# Patient Record
Sex: FEMALE | Race: WHITE | NOT HISPANIC OR LATINO | URBAN - METROPOLITAN AREA
[De-identification: names, ages, dates, MRNs, and addresses within clinical notes are randomized per-mention and may not be internally consistent; named-entity substitution may affect disease eponyms.]

---
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
## 2021-04-02 ENCOUNTER — OFFICE VISIT - RIVER FALLS (OUTPATIENT)
Dept: FAMILY MEDICINE | Facility: CLINIC | Age: 21
End: 2021-04-02

## 2021-04-02 ASSESSMENT — MIFFLIN-ST. JEOR: SCORE: 1370.29

## 2021-04-03 LAB
BUN SERPL-MCNC: 17 MG/DL (ref 7–25)
BUN/CREAT RATIO - HISTORICAL: NORMAL (ref 6–22)
CALCIUM SERPL-MCNC: 9.2 MG/DL (ref 8.6–10.2)
CHLORIDE BLD-SCNC: 104 MMOL/L (ref 98–110)
CO2 SERPL-SCNC: 27 MMOL/L (ref 20–32)
CREAT SERPL-MCNC: 0.69 MG/DL (ref 0.5–1.1)
EGFRCR SERPLBLD CKD-EPI 2021: 125 ML/MIN/1.73M2
ERYTHROCYTE [DISTWIDTH] IN BLOOD BY AUTOMATED COUNT: 11.9 % (ref 11–15)
GLUCOSE BLD-MCNC: 88 MG/DL (ref 65–99)
HCT VFR BLD AUTO: 39.8 % (ref 35–45)
HGB BLD-MCNC: 13.6 GM/DL (ref 11.7–15.5)
HIV AG/AB  - NOTE: NORMAL
MCH RBC QN AUTO: 30.6 PG (ref 27–33)
MCHC RBC AUTO-ENTMCNC: 34.2 GM/DL (ref 32–36)
MCV RBC AUTO: 89.4 FL (ref 80–100)
PLATELET # BLD AUTO: 222 10*3/UL (ref 140–400)
PMV BLD: 10.8 FL (ref 7.5–12.5)
POTASSIUM BLD-SCNC: 3.9 MMOL/L (ref 3.5–5.3)
RBC # BLD AUTO: 4.45 10*6/UL (ref 3.8–5.1)
SODIUM SERPL-SCNC: 140 MMOL/L (ref 135–146)
TSH SERPL DL<=0.005 MIU/L-ACNC: 1.99 MIU/L
WBC # BLD AUTO: 6.3 10*3/UL (ref 3.8–10.8)

## 2021-04-05 ENCOUNTER — COMMUNICATION - RIVER FALLS (OUTPATIENT)
Dept: FAMILY MEDICINE | Facility: CLINIC | Age: 21
End: 2021-04-05

## 2021-04-09 ENCOUNTER — AMBULATORY - RIVER FALLS (OUTPATIENT)
Dept: FAMILY MEDICINE | Facility: CLINIC | Age: 21
End: 2021-04-09

## 2021-04-11 LAB
GAMMA INTERFERON BACKGROUND BLD IA-ACNC: 0.01 IU/ML
M TB IFN-G BLD-IMP: NEGATIVE
M TB IFN-G CD4+ BCKGRND COR BLD-ACNC: 7.76 IU/ML
MITOGEN IGNF BCKGRD COR BLD-ACNC: 0 IU/ML
MITOGEN IGNF BCKGRD COR BLD-ACNC: 0 IU/ML

## 2021-04-12 ENCOUNTER — COMMUNICATION - RIVER FALLS (OUTPATIENT)
Dept: FAMILY MEDICINE | Facility: CLINIC | Age: 21
End: 2021-04-12

## 2021-05-27 ENCOUNTER — OFFICE VISIT - RIVER FALLS (OUTPATIENT)
Dept: FAMILY MEDICINE | Facility: CLINIC | Age: 21
End: 2021-05-27

## 2021-05-27 ENCOUNTER — AMBULATORY - RIVER FALLS (OUTPATIENT)
Dept: FAMILY MEDICINE | Facility: CLINIC | Age: 21
End: 2021-05-27

## 2021-05-28 LAB — SARS-COV-2 RNA RESP QL NAA+PROBE: NEGATIVE

## 2021-10-28 ENCOUNTER — OFFICE VISIT - RIVER FALLS (OUTPATIENT)
Dept: FAMILY MEDICINE | Facility: CLINIC | Age: 21
End: 2021-10-28

## 2022-02-12 VITALS
TEMPERATURE: 99.1 F | BODY MASS INDEX: 20.57 KG/M2 | WEIGHT: 128 LBS | HEIGHT: 66 IN | TEMPERATURE: 98.2 F | HEART RATE: 70 BPM | DIASTOLIC BLOOD PRESSURE: 52 MMHG | HEART RATE: 104 BPM | SYSTOLIC BLOOD PRESSURE: 90 MMHG | SYSTOLIC BLOOD PRESSURE: 102 MMHG | OXYGEN SATURATION: 100 % | BODY MASS INDEX: 19.51 KG/M2 | OXYGEN SATURATION: 98 % | DIASTOLIC BLOOD PRESSURE: 74 MMHG | HEIGHT: 66 IN | WEIGHT: 121.4 LBS

## 2022-02-12 VITALS
BODY MASS INDEX: 20.65 KG/M2 | TEMPERATURE: 98.4 F | HEART RATE: 90 BPM | DIASTOLIC BLOOD PRESSURE: 58 MMHG | DIASTOLIC BLOOD PRESSURE: 60 MMHG | OXYGEN SATURATION: 99 % | HEART RATE: 96 BPM | DIASTOLIC BLOOD PRESSURE: 58 MMHG | WEIGHT: 132 LBS | BODY MASS INDEX: 20.78 KG/M2 | TEMPERATURE: 98.8 F | SYSTOLIC BLOOD PRESSURE: 93 MMHG | SYSTOLIC BLOOD PRESSURE: 94 MMHG | HEART RATE: 76 BPM | WEIGHT: 126 LBS | OXYGEN SATURATION: 99 % | TEMPERATURE: 98 F | BODY MASS INDEX: 21.21 KG/M2 | WEIGHT: 126.8 LBS | HEIGHT: 66 IN | SYSTOLIC BLOOD PRESSURE: 102 MMHG

## 2022-02-12 VITALS
HEART RATE: 88 BPM | HEART RATE: 80 BPM | WEIGHT: 125 LBS | DIASTOLIC BLOOD PRESSURE: 60 MMHG | SYSTOLIC BLOOD PRESSURE: 92 MMHG | BODY MASS INDEX: 20.09 KG/M2 | HEIGHT: 66 IN | DIASTOLIC BLOOD PRESSURE: 58 MMHG | DIASTOLIC BLOOD PRESSURE: 62 MMHG | SYSTOLIC BLOOD PRESSURE: 102 MMHG | BODY MASS INDEX: 20.76 KG/M2 | TEMPERATURE: 98.6 F | TEMPERATURE: 98.2 F | HEART RATE: 76 BPM | SYSTOLIC BLOOD PRESSURE: 92 MMHG | HEIGHT: 66 IN | HEIGHT: 66 IN | WEIGHT: 125.8 LBS | WEIGHT: 129.2 LBS | TEMPERATURE: 98.7 F | BODY MASS INDEX: 20.22 KG/M2

## 2022-02-12 VITALS
DIASTOLIC BLOOD PRESSURE: 71 MMHG | OXYGEN SATURATION: 98 % | SYSTOLIC BLOOD PRESSURE: 110 MMHG | WEIGHT: 119.2 LBS | HEART RATE: 144 BPM | TEMPERATURE: 100.9 F

## 2022-02-12 VITALS
SYSTOLIC BLOOD PRESSURE: 120 MMHG | SYSTOLIC BLOOD PRESSURE: 98 MMHG | WEIGHT: 126.8 LBS | WEIGHT: 129.8 LBS | HEIGHT: 66 IN | TEMPERATURE: 98.5 F | BODY MASS INDEX: 20.86 KG/M2 | HEART RATE: 76 BPM | DIASTOLIC BLOOD PRESSURE: 62 MMHG | DIASTOLIC BLOOD PRESSURE: 68 MMHG | BODY MASS INDEX: 20.38 KG/M2 | HEIGHT: 66 IN | HEART RATE: 68 BPM | TEMPERATURE: 97.9 F

## 2022-02-12 VITALS
HEART RATE: 146 BPM | TEMPERATURE: 98.8 F | OXYGEN SATURATION: 98 % | DIASTOLIC BLOOD PRESSURE: 70 MMHG | SYSTOLIC BLOOD PRESSURE: 112 MMHG | HEIGHT: 65 IN | WEIGHT: 123.8 LBS | BODY MASS INDEX: 20.62 KG/M2

## 2022-02-12 VITALS
BODY MASS INDEX: 20.42 KG/M2 | DIASTOLIC BLOOD PRESSURE: 60 MMHG | HEART RATE: 64 BPM | WEIGHT: 124.6 LBS | SYSTOLIC BLOOD PRESSURE: 92 MMHG | TEMPERATURE: 98.2 F

## 2022-02-12 VITALS
HEIGHT: 66 IN | OXYGEN SATURATION: 97 % | SYSTOLIC BLOOD PRESSURE: 96 MMHG | HEART RATE: 75 BPM | TEMPERATURE: 98.3 F | DIASTOLIC BLOOD PRESSURE: 60 MMHG | BODY MASS INDEX: 21.13 KG/M2 | WEIGHT: 131.5 LBS

## 2022-02-12 VITALS
WEIGHT: 127 LBS | WEIGHT: 129 LBS | TEMPERATURE: 97.3 F | RESPIRATION RATE: 16 BRPM | TEMPERATURE: 97.9 F | HEART RATE: 80 BPM | SYSTOLIC BLOOD PRESSURE: 106 MMHG | BODY MASS INDEX: 20.41 KG/M2 | HEIGHT: 66 IN | DIASTOLIC BLOOD PRESSURE: 68 MMHG | SYSTOLIC BLOOD PRESSURE: 94 MMHG | OXYGEN SATURATION: 99 % | HEART RATE: 70 BPM | DIASTOLIC BLOOD PRESSURE: 64 MMHG

## 2022-02-12 VITALS
WEIGHT: 126.6 LBS | SYSTOLIC BLOOD PRESSURE: 100 MMHG | OXYGEN SATURATION: 98 % | TEMPERATURE: 97.5 F | HEART RATE: 101 BPM | DIASTOLIC BLOOD PRESSURE: 60 MMHG

## 2022-02-12 VITALS
HEART RATE: 80 BPM | HEIGHT: 66 IN | SYSTOLIC BLOOD PRESSURE: 104 MMHG | TEMPERATURE: 98.4 F | BODY MASS INDEX: 20.65 KG/M2 | DIASTOLIC BLOOD PRESSURE: 60 MMHG

## 2022-02-15 NOTE — PROGRESS NOTES
Patient:   JUAN SHAW            MRN: [id number]            FIN: [id number]               Age:   17 years     Sex:  Female     :  2000   Associated Diagnoses:   Acute URI; Asthma exacerbation   Author:   Barbra Romero      Visit Information      Date of Service: 2017 10:55 am  Performing Location: [facility]  Encounter#: [id number]      Chief Complaint   2017 11:08 AM CDT    Cough, fatigue x5 days.      History of Present Illness   Patient is a 17 year old female with past medical history of asthma who presents with her mother for evaluation of productive cough and worsening shortness of breath over the past 5 days. Patient reports onset of fatigue, cough, headache, body aches, and difficulty breathing 4 days ago. Also complains of intermittent fever and chills. States shortness of breath has continued to worsen despite following her asthma action plan. She is using albuterol, flovent, and advair as prescribed. Patient woke up with more severe symptoms this morning and used nebulizer machine around 0800. She proceeded to go to work at [employer] but within 45 minutes had to leave due to her symptoms. Mother subsequently brought patient to clinic to be evaluated. Denies hemoptysis, sore throat, nausea, vomiting, abdominal pain, or chest pain. Notes that she has been exposed to sick contacts including boyfriend and friends at school. Patient reports presentation feels similar to asthma exacerbations in the past, notes these are usually triggered by URIs. Last exacerbation was in 2016 - she was treated at that time with prednisone with good relief.       Review of Systems   Constitutional:  Fever, Chills.    Eye:  Negative.    Ear/Nose/Mouth/Throat:  Negative.    Respiratory:  Shortness of breath, Cough, Wheezing, No hemoptysis.    Cardiovascular:  No chest pain.       Health Status   Allergies:    Allergic Reactions (Selected)  No Known Medication Allergies    Medications:  (Selected)   Prescriptions  Prescribed  predniSONE 20 mg oral tablet: 2 tab(s) ( 40 mg ), PO, Daily, # 10 tab(s), 0 Refill(s), Type: Maintenance, Pharmacy: [id number], 2 tab(s) po daily,x5 day(s)  Documented Medications  Documented  Advair Diskus 250 mcg-50 mcg inhalation powder: 2 puff(s), inh, bid, 0 Refill(s), Type: Maintenance  Flovent  mcg/inh inhalation aerosol: 2 puff(s), inh, bid, 0 Refill(s), Type: Maintenance  Kraen 3 mg-0.03 mg oral tablet: 1 tab(s), po, daily, 0 Refill(s), Type: Maintenance  albuterol 90mcg/inhalation MDI: 0 Refill(s), Type: Maintenance   Problem list:    All Problems  Asthma / SNOMED CT 079772861 / Confirmed      Histories   Past Medical History:    No active or resolved past medical history items have been selected or recorded.   Family History:    Diabetes mellitus  Grandfather (M)  Seizure  Brother  HTN - Hypertension  Grandfather (M)  Grandmother (M)     Procedure history:    No active procedure history items have been selected or recorded.   Social History:        Alcohol Assessment            Never      Tobacco Assessment            Never      Home and Environment Assessment            Risks in environment: Gun in the home..        Physical Examination   Vital Signs   4/9/2017 11:08 AM CDT Temperature Tympanic 100.9 DegF  HI    Peripheral Pulse Rate 144 bpm  HI    Systolic Blood Pressure 110 mmHg    Diastolic Blood Pressure 71 mmHg    Mean Arterial Pressure 84 mmHg    Oxygen Saturation 98 %    Vital Signs Comments HR elevated due to nebulizer treatment at 730am      Measurements from flowsheet : Measurements   4/9/2017 11:08 AM CDT    Weight Measured - Standard                119.2 lb     General:  Alert and oriented, No acute distress.    Eye:  Pupils are equal, round and reactive to light, Normal conjunctiva.    HENT:  Tympanic membranes are clear, Oral mucosa is moist, No pharyngeal erythema.    Neck:  Supple, Non-tender, No  lymphadenopathy.    Respiratory:  Decreased air movement throughout lung fields, no audible wheezing but patient notes that she did use nebulizer just prior to coming to clinic today.    Cardiovascular:  Regular rhythm, No murmur, No gallop, Tachycardia.    Musculoskeletal:  Normal range of motion, Normal gait.    Integumentary:  Warm, Dry, No rash.    Neurologic:  Alert, Oriented.    Psychiatric:  Cooperative, Appropriate mood & affect.       Impression and Plan   Diagnosis     Acute URI (WBE21-KS J06.9).     Asthma exacerbation (MMY68-LE J45.901).     Patient presents with asthma exacerbation exacerbated in setting of URI. Febrile at 100.9F upon arrival. Also tachycardic at 144 bpm but patient notes that she used nebulizer at home this morning prior to coming to clinic. Will treat patient with prednisone burst x 5 days. Given her fever today and complaint of productive coughing, will also provide z-quinn for patient to take as prescribed. Mother wondering if patient can have rx for diflucan as she is prone to yeast infections after abx - this was called in to pharmacy. School note and work note provided upon discharge. Recommended her to continue using home asthma treatments. She was advised to return to clinic if symptoms not improving or go to ED if symtoms worsen. Patient and her mother verbalized understanding and agreement with treatment plan. All questions were answered.    agree with exam and plan

## 2022-02-15 NOTE — PROGRESS NOTES
Patient:   JUAN SHAW            MRN: [id number]            FIN: [id number]               Age:   19 years     Sex:  Female     :  2000   Associated Diagnoses:   Acute otitis media, right   Author:   Frandy Hernandez MD      Visit Information      Date of Service: 2020 02:40 pm  Performing Location: [facility]  Encounter#: [id number]      Primary Care Provider (PCP):  NONE ,       Referring Provider:  Frandy Hernandez MD    NPI# [id number]      Chief Complaint   2020 2:48 PM CST    Patient presents with plugged right ear and sore throat x4 days.        History of Present Illness   chief complaint and symptoms as noted above confirmed with patient   green nasal dc  no fever      Review of Systems   Constitutional:  Negative except as documented in history of present illness.    Ear/Nose/Mouth/Throat:  Negative except as documented in history of present illness.    Respiratory:  Negative.    Neurologic:  Negative.       Health Status   Allergies:    Allergic Reactions (Selected)  No Known Medication Allergies   Medications:  (Selected)   Prescriptions  Prescribed  Clindagel 1% topical gel: 1 ximena, Topical, hs, # 30 gm, 5 Refill(s), Type: Maintenance, Pharmacy: [facility] #[id number], gel or solution okay, 1 ximena Topical hs  Flonase 50 mcg/inh nasal spray: = 2 spray(s), Nasal, daily, # 1 EA, 6 Refill(s), Type: Maintenance, Pharmacy: [facility] #[id number], 2 spray(s) Nasal daily  Proventil HFA 90 mcg/inh inhalation aerosol: 2 puff(s), Inhale, q4-6 hrs, Instructions: do not fill till she calls, # 1 EA, 1 Refill(s), Type: Maintenance, Pharmacy: [facility] #[id number], 2 puff(s) Inhale q4-6 hrs,Instr:do not fill till she calls  SUMAtriptan 50 mg oral tablet: See Instructions, Instructions: 1/2 - 2 tab(s) PO Once  may repeat dose once in 2 hours, maximum of 200 mg per 24 hours, PRN: for migraine headache, # 9 tab(s), 11 Refill(s), Type: Soft Stop, Pharmacy: [id number], 1/2 - 2 tab(s) PO Onc...  Wellbutrin  mg/24 hours oral tablet, extended release: = 1 tab(s) ( 150 mg ), Oral, q 24 hrs, # 90 EA, 1 Refill(s), Type: Maintenance, Pharmacy: [facility] #[id number], 1 tab(s) Oral q 24 hrs  amoxicillin 875 mg oral tablet: = 1 tab(s) ( 875 mg ), PO, BID, x 10 day(s), # 20 tab(s), 0 Refill(s), Type: Acute, Pharmacy: [facility] #[id number], 1 tab(s) Oral bid,x10 day(s)  ondansetron 4 mg oral tablet, disintegratin tab(s) ( 4 mg ), PO, q8 hrs, # 10 tab(s), 1 Refill(s), Type: Maintenance, Pharm[id number], 1 tab(s) po q8 hrs  sertraline 100 mg oral tablet: = 1.5 tab(s), Oral, daily, # 135 tab(s), 3 Refill(s), Type: Maintenance, Pharmacy: [facility] #[id number], 1.5 tab(s) Oral daily  Documented Medications  Documented  Kyleena 19.5 mg intrauterine device: = 1 EA ( 19.5 mg ), Intrauteral, once, Instructions: inserted by NCB on 19, due for removal by 24., 0 Refill(s), Type: Maintenance,    Medications          *denotes recorded medication          SUMAtriptan 50 mg oral tablet: See Instructions, 1/2 - 2 tab(s) PO Once  may repeat dose once in 2 hours, maximum of 200 mg per 24 hours, PRN: for migraine headache, 9 tab(s), 11 Refill(s).          Proventil HFA 90 mcg/inh inhalation aerosol: 2 puff(s), Inhale, q4-6 hrs, do not fill till she calls, 1 EA, 1 Refill(s).          amoxicillin 875 mg oral tablet: 875 mg, 1 tab(s), PO, BID, for 10 day(s), 20 tab(s), 0 Refill(s).          Wellbutrin  mg/24 hours oral tablet, extended release: 150 mg, 1 tab(s), Oral, q 24 hrs, 90 EA, 1 Refill(s).          Clindagel 1% topical gel: 1 ximena, Topical, hs, 30 gm, 5 Refill(s).          Flonase 50 mcg/inh nasal spray: 2 spray(s), Nasal, daily, 1 EA, 6 Refill(s).          *Kyleena 19.5 mg intrauterine device: 19.5 mg, 1 EA, Intrauteral, once, inserted by NCB on 19, due for removal by 24., 0 Refill(s).          ondansetron 4 mg oral  tablet, disintegratin mg, 1 tab(s), PO, q8 hrs, 10 tab(s), 1 Refill(s).          sertraline 100 mg oral tablet: 1.5 tab(s), Oral, daily, 135 tab(s), 3 Refill(s).       Problem list:    All Problems (Selected)  Acne vulgaris / SNOMED CT 707622613 / Confirmed  Asthma / SNOMED CT 560489263 / Confirmed  NANDO (generalized anxiety disorder) / SNOMED CT 82079258 / Confirmed  Mild major depression / SNOMED CT 344343892 / Confirmed      Histories   Past Medical History:    Resolved  H/O: chickenpox ([id number]):  Resolved.   Family History:    Diabetes mellitus  Grandfather (M)  Seizure  Brother  HTN - Hypertension  Grandfather (M)  Grandmother (M)     Procedure history:    Insertion of IUD (SNOMED CT 501959621) performed by Kelley Coker on 2019 at 19 Years.  Comments:  2019 2:10 PM CDT - Christine Baez  Consent form signed with NCB. Kyleena IUD placed.    Due for removal by 2024    Lot:[id number]  Exp: 2021  Myringotomy and insertion of tympanic ventilation tube (SNOMED CT 3303717487).   Social History:        Alcohol Assessment            Current, 1-2 times per month, 4 drinks/episode average.  5 drinks/episode maximum.      Tobacco Assessment            Never      Home and Environment Assessment            Risks in environment: Gun in the home..        Physical Examination   Vital Signs   2020 2:48 PM CST Temperature Tympanic 98.4 DegF    Peripheral Pulse Rate 90 bpm    Systolic Blood Pressure 93 mmHg    Diastolic Blood Pressure 60 mmHg    Mean Arterial Pressure 71 mmHg    BP Site Right arm    BP Method Electronic    Oxygen Saturation 99 %      Measurements from flowsheet : Measurements   2020 2:48 PM CST    Weight Measured - Standard                126 lb     General:  Alert and oriented, No acute distress.    Eye:  Normal conjunctiva.    HENT:          Ear: Right ear, Tympanic membrane ( Erythematous, Fluid in middle ear ).         Nose: Both nostrils, Discharge ( Moderate amount,  Green ).         Throat: Pharynx ( Erythematous ).    Neck:  Supple, Non-tender, No lymphadenopathy.       Impression and Plan   Diagnosis     Acute otitis media, right (BJG00-TG H66.91).     Course:  Progressing as expected.    Plan:  Amoxicillin  fu 1 week if not better sooner if worse.    Patient Instructions:       Counseled: Patient, Regarding diagnosis, Regarding treatment, Regarding medications.

## 2022-02-15 NOTE — NURSING NOTE
Generalized Anxiety Disorder Screening Entered On:  8/5/2019 10:13 AM CDT    Performed On:  8/2/2019 10:06 AM CDT by Irene Cobb               Generalized Anxiety Disorder Screening   NANDO Nervous, Anxious On Edge :   Several days   NANDO Control Worrying B :   Not at all   NANDO Worrying Too Much :   Several days   NANDO Restless :   Not at all   NANDO Easily Annoyed/Irritable :   More than half the days   NANDO Afraid :   Not at all   NANDO Trouble Relaxing :   Several days   NANDO Total Screening Score :   5    NANDO Difficulty with Work, Home, Others :   Somewhat difficult   Irene Cobb - 8/5/2019 10:06 AM CDT

## 2022-02-15 NOTE — PROGRESS NOTES
Patient:   JUAN SHAW            MRN: [id number]            FIN: [id number]               Age:   21 years     Sex:  Female     :  2000   Associated Diagnoses:   Asthma; Cough; Wheezing   Author:   Kelley Coker      Visit Information      Date of Service: 2021 07:36 am  Performing Location: [facility]  Encounter#: [id number]      Primary Care Provider (PCP):  NONE ,       Referring Provider:  Kelley Coker    NPI# [id number]   Visit type:  video.    Participants in room during visit:  _pt   Location of patient:  _home at parents in , usually lives in [place]  Location of provider:  _ clinic  Video Start Time:  8:35  Video End Time:   _8:45    Today's visit was conducted via video conference due to the COVID-19 pandemic.  The patient's consent to proceed with a video visit has been obtained and documented.      Chief Complaint   2021 8:30 AM CDT    sore throat, sinus pressure, chest tightness and trouble breathing, headache, bodyaches, fatigue, first symptoms started 22 - verbal consent for video visit on smartphone      History of Present Illness   Patient is a 21_ year old _F who is being evaluated via a billable video visit.  onset 5 days ago of sore throat, sinus pressure, chest tightness and trouble breathing that seems to be worsening. Also HA and body aches and fatigue. She has had two COVID vaccines. SHe has hx of asthma but hasn't needed steroid inhalers or prednisone for years. She has uses her albuterol about once a day during this 'flare' but doesn't seems to help. She is in town this evening but will be back at her home in [place]  for an appointment to have asthma evaluated early next week. Would like COVID curbside testing today      Health Status   Allergies:    Allergic Reactions (Selected)  No Known Medication Allergies   Medications:  (Selected)   Prescriptions  Prescribed  Clindagel 1% topical gel: 1 ximena, Topical, hs, # 30 gm, 5  Refill(s), Type: Maintenance, Pharmacy: [facility] #[id number], gel or solution okay, 1 ximena Topical hs  Proventil HFA 90 mcg/inh inhalation aerosol: 2 puff(s), Inhale, q4-6 hrs, Instructions: do not fill till she calls, # 1 EA, 1 Refill(s), Type: Maintenance, Pharmacy: [facility] #[id number], 2 puff(s) Inhale q4-6 hrs,Instr:do not fill till she calls  SUMAtriptan 50 mg oral tablet: See Instructions, Instructions: 1/2 - 2 tab(s) PO Once  may repeat dose once in 2 hours, maximum of 200 mg per 24 hours, PRN: for migraine headache, # 9 tab(s), 11 Refill(s), Type: Soft Stop, Pharmacy: [id number], 1/2 - 2 tab(s) PO Onc...  Wellbutrin  mg/24 hours oral tablet, extended release: = 1 tab(s) ( 150 mg ), Oral, q 24 hrs, # 90 tab(s), 3 Refill(s), Type: Maintenance, Pharmacy: [facility] #[id number], 1 tab(s) Oral q 24 hrs, 65.5, in, 04/05/20 14:50:00 CDT, Height Measured, Weight Measured  sertraline 100 mg oral tablet: = 1.5 tab(s), Oral, daily, # 135 tab(s), 3 Refill(s), Type: Maintenance, Pharmacy: [facility] #[id number], 1.5 tab(s) Oral daily, 65.5, in, 04/05/20 14:50:00 CDT, Height Measured, 126, lb, 01/31/20 14:48:00 CST, Weight Measured  Documented Medications  Documented  Kyleena 19.5 mg intrauterine device: = 1 EA ( 19.5 mg ), Intrauteral, once, Instructions: inserted by NCB on 6/14/19, due for removal by 6/14/24., 0 Refill(s), Type: Maintenance,    Medications          *denotes recorded medication          SUMAtriptan 50 mg oral tablet: See Instructions, 1/2 - 2 tab(s) PO Once  may repeat dose once in 2 hours, maximum of 200 mg per 24 hours, PRN: for migraine headache, 9 tab(s), 11 Refill(s).          Proventil HFA 90 mcg/inh inhalation aerosol: 2 puff(s), Inhale, q4-6 hrs, do not fill till she calls, 1 EA, 1 Refill(s).          Wellbutrin  mg/24 hours oral tablet, extended release: 150 mg, 1 tab(s), Oral, q 24 hrs, 90 tab(s), 3 Refill(s).          Clindagel 1% topical  gel: 1 ximena, Topical, hs, 30 gm, 5 Refill(s).          *Kyleena 19.5 mg intrauterine device: 19.5 mg, 1 EA, Intrauteral, once, inserted by NCB on 6/14/19, due for removal by 6/14/24., 0 Refill(s).          sertraline 100 mg oral tablet: 1.5 tab(s), Oral, daily, 135 tab(s), 3 Refill(s).       Problem list:    All Problems  Acne vulgaris / SNOMED CT 473400630 / Confirmed  Asthma / SNOMED CT 487518080 / Confirmed  NANDO (generalized anxiety disorder) / SNOMED CT 80567993 / Confirmed  Mild major depression / SNOMED CT 095329852 / Confirmed      Histories   Past Medical History:    Resolved  H/O: chickenpox ([id number]):  Resolved.   Family History:    Diabetes mellitus  Grandfather (M)  Seizure  Brother  HTN - Hypertension  Grandfather (M)  Grandmother (M)     Procedure history:    Insertion of IUD (SNOMED CT 248207692) performed by Kelley Coker on 6/14/2019 at 19 Years.  Comments:  6/14/2019 2:10 PM CDT - Christine Baez  Consent form signed with NCB. Kyleena IUD placed.    Due for removal by 6/14/2024    Lot:[id number]  Exp: 02/2021  Myringotomy and insertion of tympanic ventilation tube (SNOMED CT 5324835972).   Social History:        Electronic Cigarette/Vaping Assessment            Electronic Cigarette Use: Former use, quit more than 90 days ago.      Alcohol Assessment            Current, 1-2 times per month, 4 drinks/episode average.  5 drinks/episode maximum.      Tobacco Assessment            Never (less than 100 in lifetime)      Home and Environment Assessment            Risks in environment: Gun in the home..        Physical Examination   General:  Alert and oriented, No acute distress.    Eye:  Normal conjunctiva.    Respiratory:  Respirations are non-labored.    Psychiatric:  Cooperative, Appropriate mood & affect, Normal judgment.       Impression and Plan   Diagnosis     Asthma (HHA35-YU J45.41).     Cough (INF95-BP R05).     Wheezing (GWY86-QQ R06.2).     Patient Instructions:       Counseled: Patient.     Orders     Patient is referred for curbside COVID-19 testing and is instructed of the following:  Patient should remain isolated until results of test return and given that tests are not 100% accurate, would be safest to assume that they are contagious with COVID-19 until their symptoms have fully resolved. Isolation is recommended for at least 7 days from the onset of symptoms and for 3 days after resolution of fevers and productive cough, unless test is positive, or exposure with COVID positive contact is confirmed, then isolation should be for 14 days. This means patient should not go to work or any public areas. In addition, it is recommended at home that they separate themselves from other people and from animals as much as possible, including using a separate bathroom. If they do need to be around others, a face mask is recommended. Frequent hand hygiene and cleaning of high touch surfaces is also recommended.   Symptoms can last for several weeks. For patients with COVID-19, they can sometimes start to improve and then get worse again. If symptoms worsen at any time, including significant shortness of breath, low oxygen levels, high fevers that cannot be controlled, or concerns for dehydration, they should seek medical care. If going to the ER, calling 911, or seeking care at the clinic, they are reminded to notify staff that they have been tested for COVID-19.  Patient also is informed that testing will be done in their car at a scheduled time.   Patient is also informed that testing for COVID-19 must be reported to the public health department along with contact information for the patient.   Patient information is given to scheduling staff to get patient scheduled for testing. Patient will receive further instructions from scheduling staff.  Patient is encouraged to call back at any time with questions or concerns.      Set up for curbside today, inhaled steroid and increased albuterol, oral kprednisone  as back up if inhaled steroid not affodable  keep appt with primary next week, seek eval sooner if worsening.

## 2022-02-15 NOTE — TELEPHONE ENCOUNTER
Entered by Vera Solomon LPN on May 10, 2019 2:38:32 PM CDT  ---------------------  From: Vera Solomon LPN   To: [facility] [id number]    Sent: 5/10/2019 2:38:32 PM CDT  Subject: Medication Management     ** Submitted: **  Order:sertraline (sertraline 100 mg oral tablet)  1.5 tab(s)  Oral  daily  Qty:  135 tab(s)        Days Supply:  90        Refills:  0          Substitutions Allowed     Route To [facility] [place] [address]    Signed by Vera Solomon LPN  5/10/2019 2:37:00 PM    ** Submitted: **  Complete:sertraline (sertraline 100 mg oral tablet)   Signed by Vera Solomon LPN  5/10/2019 2:38:00 PM    ** Not Approved:  **  sertraline (SERTRALINE 100MG TABLETS)  TAKE 1 AND 1/2 TABLETS BY MOUTH DAILY  Qty:  135 tab(s)        Days Supply:  90        Refills:  0          Substitutions Allowed     Route To [facility] [place] [address]   Signed by Vera Solomon LPN            ------------------------------------------  From: [facility] [place] [address]  To: Kelley Coker  Sent: May 9, 2019 11:55:26 AM CDT  Subject: Medication Management  Due: May 10, 2019 11:55:26 AM CDT    ** On Hold Pending Signature **  Drug: sertraline (sertraline 100 mg oral tablet)  1.5 TAB(S) ORAL DAILY  Quantity: 135 tab(s)    Days Supply: 0         Refills: 0  Substitutions Allowed  Notes from Pharmacy:     Dispensed Drug: sertraline (sertraline 100 mg oral tablet)  TAKE 1 AND 1/2 TABLETS BY MOUTH DAILY  Quantity: 135 tab(s)    Days Supply: 90        Refills: 0  Substitutions Allowed  Notes from Pharmacy:   ------------------------------------------Date of last office visit and reason:  _ 3- med check      Date of last Med Check / Px:   _  Date of last labs pertaining to med:  _    RTC order in chart:  _ RTC 3 mo from 3-2019     For Protocol refill, has patient been contacted:  _ 30 day supply sent to make sure pt has enough to get to her appointment. called and LM for pt stating medication was sent and RTC  3-2019

## 2022-02-15 NOTE — PROGRESS NOTES
Patient:   JUAN SHAW            MRN: [id number]            FIN: [id number]               Age:   20 years     Sex:  Female     :  2000   Associated Diagnoses:   NANDO (generalized anxiety disorder); Mild major depression   Author:   Kelley Coker      Visit Information      Date of Service: 2020 09:02 am  Performing Location: [facility]  Encounter#: [id number]      Primary Care Provider (PCP):  NONE ,       Referring Provider:  Kelley Coker    NPI# [id number]   Visit type:  video.    Participants in room during visit:  _pt   Location of patient:  _home  Location of provider:  _ clinic  Video Start Time:  4:35  Video End Time:   4:41_    Today's visit was conducted via video conference due to the COVID-19 pandemic.  The patient's consent to proceed with a video visit has been obtained and documented.      Chief Complaint   2020 4:10 PM CDT    not sure why she needed appt; was told she needed f/u visit        History of Present Illness   Patient is a _20 year old _female who is being evaluated via a billable video visit.  We talked for a while to figure out why she received a reminder and it is because it has been 6 months since visit for depression.   she uses SSRI and wellbutrin and is very happy with this and wants to continue  no side effects  coping well with COVID19  happy with IUD      Health Status   Allergies:    Allergic Reactions (Selected)  No Known Medication Allergies   Medications:  (Selected)   Prescriptions  Prescribed  Clindagel 1% topical gel: 1 ximena, Topical, hs, # 30 gm, 5 Refill(s), Type: Maintenance, Pharmacy: [facility] #[id number], gel or solution okay, 1 ximena Topical hs  Flonase 50 mcg/inh nasal spray: = 2 spray(s), Nasal, daily, # 1 EA, 6 Refill(s), Type: Maintenance, Pharmacy: [facility] #[id number], 2 spray(s) Nasal daily  Proventil HFA 90 mcg/inh inhalation aerosol: 2 puff(s), Inhale, q4-6 hrs, Instructions: do not fill till she  calls, # 1 EA, 1 Refill(s), Type: Maintenance, Pharmacy: [facility] #[id number], 2 puff(s) Inhale q4-6 hrs,Instr:do not fill till she calls  SUMAtriptan 50 mg oral tablet: See Instructions, Instructions: 1/2 - 2 tab(s) PO Once  may repeat dose once in 2 hours, maximum of 200 mg per 24 hours, PRN: for migraine headache, # 9 tab(s), 11 Refill(s), Type: Soft Stop, Pharmacy: [id number], 1/2 - 2 tab(s) PO Onc...  Wellbutrin  mg/24 hours oral tablet, extended release: = 1 tab(s) ( 150 mg ), Oral, q 24 hrs, # 90 tab(s), 3 Refill(s), Type: Maintenance, Pharmacy: [facility] #[id number], 1 tab(s) Oral q 24 hrs, 65.5, in, 04/05/20 14:50:00 CDT, Height Measured, Weight Measured  sertraline 100 mg oral tablet: = 1.5 tab(s), Oral, daily, # 135 tab(s), 3 Refill(s), Type: Maintenance, Pharmacy: [facility] #[id number], 1.5 tab(s) Oral daily, 65.5, in, 04/05/20 14:50:00 CDT, Height Measured, 126, lb, 01/31/20 14:48:00 CST, Weight Measured  Documented Medications  Documented  Kyleena 19.5 mg intrauterine device: = 1 EA ( 19.5 mg ), Intrauteral, once, Instructions: inserted by [facility] on 6/14/19, due for removal by 6/14/24., 0 Refill(s), Type: Maintenance,    Medications          *denotes recorded medication          SUMAtriptan 50 mg oral tablet: See Instructions, 1/2 - 2 tab(s) PO Once  may repeat dose once in 2 hours, maximum of 200 mg per 24 hours, PRN: for migraine headache, 9 tab(s), 11 Refill(s).          Proventil HFA 90 mcg/inh inhalation aerosol: 2 puff(s), Inhale, q4-6 hrs, do not fill till she calls, 1 EA, 1 Refill(s).          Wellbutrin  mg/24 hours oral tablet, extended release: 150 mg, 1 tab(s), Oral, q 24 hrs, 90 tab(s), 3 Refill(s).          Clindagel 1% topical gel: 1 ximena, Topical, hs, 30 gm, 5 Refill(s).          Flonase 50 mcg/inh nasal spray: 2 spray(s), Nasal, daily, 1 EA, 6 Refill(s).          *Kyleena 19.5 mg intrauterine device: 19.5 mg, 1 EA, Intrauteral, once, inserted  by [facility] on 6/14/19, due for removal by 6/14/24., 0 Refill(s).          sertraline 100 mg oral tablet: 1.5 tab(s), Oral, daily, 135 tab(s), 3 Refill(s).       Problem list:    All Problems  Acne vulgaris / SNOMED CT 377382423 / Confirmed  Asthma / SNOMED CT 906272379 / Confirmed  NANDO (generalized anxiety disorder) / SNOMED CT 25194580 / Confirmed  Mild major depression / SNOMED CT 896685556 / Confirmed      Histories   Past Medical History:    Resolved  H/O: chickenpox ([id number]):  Resolved.   Family History:    Diabetes mellitus  Grandfather (M)  Seizure  Brother  HTN - Hypertension  Grandfather (M)  Grandmother (M)     Procedure history:    Insertion of IUD (SNOMED CT 296433669) performed by Kelley Coker on 6/14/2019 at 19 Years.  Comments:  6/14/2019 2:10 PM LUCINDAT - Christine Baez  Consent form signed with [facility]. Kyleena IUD placed.    Due for removal by 6/14/2024    Lot:[id number]  Exp: 02/2021  Myringotomy and insertion of tympanic ventilation tube (SNOMED CT 6080205785).   Social History:        Alcohol Assessment            Current, 1-2 times per month, 4 drinks/episode average.  5 drinks/episode maximum.      Tobacco Assessment            Never      Home and Environment Assessment            Risks in environment: Gun in the home..        Physical Examination   General:  Alert and oriented, No acute distress.    Eye:  Normal conjunctiva.    Respiratory:  Respirations are non-labored.    Psychiatric:  Cooperative, Appropriate mood & affect, Normal judgment.       Impression and Plan   Diagnosis     NANDO (generalized anxiety disorder) (ZBT74-PK F41.1).     Mild major depression (GSA85-NF F32.0).     Patient Instructions:       Counseled: Patient, follow up in 1 year  call sooner if choosing to taper.    Orders     Orders (Selected)   Prescriptions  Prescribed  Wellbutrin  mg/24 hours oral tablet, extended release: = 1 tab(s) ( 150 mg ), Oral, q 24 hrs, # 90 tab(s), 3 Refill(s), Type: Maintenance,  Pharmacy: [facility] #[id number], 1 tab(s) Oral q 24 hrs, 65.5, in, 04/05/20 14:50:00 CDT, Height Measured, Weight Measured  sertraline 100 mg oral tablet: = 1.5 tab(s), Oral, daily, # 135 tab(s), 3 Refill(s), Type: Maintenance, Pharmacy: [facility] #[id number], 1.5 tab(s) Oral daily, 65.5, in, 04/05/20 14:50:00 CDT, Height Measured, 126, lb, 01/31/20 14:48:00 CST, Weight Measured.

## 2022-02-15 NOTE — PROGRESS NOTES
Chief Complaint    Pt here today c/o migraines. Medication is not helping. Nausea and vomiting.  History of Present Illness      Patient is here today with her mom.  She woke up this morning with a migraine headache.  She has been taking amitriptyline 50 mg p.o. daily for about 2 months now which was [facility] by [facility] for migraine prevention but does not have any abortive medications available.  Her pain right now is about a 6 out of 10 after taking some ibuprofen and Excedrin with caffeine at home.  She also had some nausea this morning but feels better now that she is still in regards to her nausea.  She has never been given any more abortive medication before.  Usually she has to go to sleep and wait for the headache to end.  She would like to try some medications at this time due to the severity of her symptoms.  She reports that the headaches now occur about twice a month.  She thinks that the frequency of the headaches has not really improved but that sometimes when she feels a migraine coming on she will have some of the aura symptoms but not develop a full-blown migraine since starting the amitriptyline.  She is also on on estrogen contraceptive pill.  Her migraines are not associated with her menstrual cycle.  Review of Systems      Negative except HPI  Physical Exam   Vitals & Measurements    T: 97.5(Tympanic)  HR: 101(Peripheral)  BP: 100/60  SpO2: 98%     WT: 126.6 lb       General alert and oriented ×3 and moderate distress secondary to headache pain HEENT pupils are equally round and reactive to light extraocular motion is intact, she has some photosensitivity neuro cranial nerves II through XII are grossly intact she has no focal deficits.  Chest bilateral rise with no increased work of breathing cardiovascular normal perfusion and brisk capillary refill gait is normal  Assessment/Plan       Migraine with aura         Patient improved after 3 mg of Imitrex subcutaneous and 60 mg  Toradol IM.  Prescription is written for Soma triptan and ondansetron orally disintegrating tablet as needed migraine headache and nausea respectively.  Also counseled mom and patient regarding the relative contraindication of estrogen-containing contraception in the setting of classical migraine headaches that are not menstrual cycle related.  Discussed that alternatives for contraception for dysmenorrhea treatment could be progesterone only such as Depo-Provera Nexplanon progesterone only containing birth control pill Rosa Mirena or ParaGard IUD also mentioned relative contraindication of using an IUD in a patient that is nulliparous.  Patient and her mom are welcome to follow-up with me or her primary care physician regarding her possible change in contraception as well as for her migraine headaches.         Ordered:          ketorolac, 60 mg, im, once          SUMAtriptan, See Instructions, Instructions: 1/2 - 2 tab(s) PO Once may repeat dose once in 2 hours, maximum of 200 mg per 24 hours, PRN: for migraine headache, # 9 tab(s), 11 Refill(s), Type: Soft Stop, Pharmacy: [facility] [id number], 1/2 - 2 tab(s) PO Onc...          SUMAtriptan, 3 mg, subcutaneous, once          52611 therapeutic prophylactic/dx injection subq/im (Charge), Quantity: 1, Migraine with aura          96204 therapeutic prophylactic/dx injection subq/im (Charge), Quantity: 1, Migraine with aura           ketorolac tromethamine inj, 15 mg (Charge), Quantity: 4, Migraine with aura           sumatriptan succinate / 6 mg (Charge), Quantity: 1, Migraine with aura                Orders:         ondansetron, 1 tab(s) ( 4 mg ), PO, q8 hrs, # 10 tab(s), 0 Refill(s), Type: Maintenance, Pharmacy: [id number], 1 tab(s) po q8 hrs  Problem List/Past Medical History    Ongoing     Asthma    Historical  Procedure/Surgical History     81833 unlisted px skin muc membrane +subq tissue (Charge) (09/07/2016)  Medications    Advair  Diskus 250 mcg-50 mcg inhalation powder, 2 puff(s), inh, bid    albuterol 90mcg/inhalation MDI    amitriptyline 25 mg oral tablet, 50 mg= 2 tab(s), po, hs    Diflucan 150 mg oral tablet, 150 mg= 1 tab(s), po, once    Flovent  mcg/inh inhalation aerosol, 2 puff(s), inh, bid    ondansetron 4 mg oral tablet, disintegrating, 4 mg= 1 tab(s), po, q8 hrs    SUMAtriptan 50 mg oral tablet, See Instructions, PRN, 11 refills    Karen 3 mg-0.03 mg oral tablet, 1 tab(s), po, daily  Allergies    No Known Medication Allergies  Social History    Smoking Status - 10/09/2016     Never smoker     Alcohol - 12/31/2014      Never     Home and Environment - 12/31/2014      Risks in environment: Gun in the home..     Tobacco - 12/31/2014      Never  Family History    Diabetes mellitus: Grandfather (M).    HTN - Hypertension: Grandfather (M) and Grandmother (M).    Seizure: Brother.  Immunizations      Vaccine Date Status Comments      Hep A, pediatric/adolescent 09/01/2015 Given      tetanus/diphth/pertuss (Tdap) adult/adol 09/01/2015 Given      human papillomavirus vaccine 09/01/2015 Given      Hep A, pediatric/adolescent 04/23/2015 Given      human papillomavirus vaccine 04/23/2015 Given      meningococcal conjugate vaccine 07/23/2013 Recorded      human papillomavirus vaccine 07/23/2013 Recorded      influenza virus vaccine, inactivated 11/28/2012 Recorded      tetanus/diphth/pertuss (Tdap) adult/adol 11/28/2012 Recorded      human papillomavirus vaccine 11/28/2012 Recorded      DTaP 08/21/2006 Recorded      MMR (measles/mumps/rubella) 08/21/2006 Recorded      IPV 08/21/2006 Recorded      DTaP 09/03/2003 Recorded      varicella 09/13/2002 Recorded      Hep B 09/13/2002 Recorded      Hep B-Hib 03/27/2002 Recorded      MMR (measles/mumps/rubella) 03/27/2002 Recorded      DTaP 2001 Recorded      pneumococcal (PCV7) 2001 Recorded      IPV 2001 Recorded      DTaP 2001 Recorded      Hib (PRP-T) 2001  Recorded      IPV 2001 Recorded      DTaP 2001 Recorded      Hep B-Hib 2001 Recorded      IPV 2001 Recorded      Hep B 2001 Recorded      pneumococcal (PCV7) - Not Given Not Necessary      pneumococcal (PCV7) - Not Given Not Necessary      pneumococcal (PCV7) - Not Given Not Necessary      Hib (HbOC) - Not Given Not Necessary  Lab Results      Results (Last 90 days)      No results located.

## 2022-02-15 NOTE — TELEPHONE ENCOUNTER
"---------------------  From: Marla Espitia CMA (Phone Messages Pool ([facility]))   To: [facility] Message Pool ([address] [place] [address]);     Sent: 6/10/2019 8:53:34 AM CDT  Subject: IUD consult/insert     Phone Message    PCP:   none - asked for [facility]      Time of Call:  0742       Person Calling:  pt's motherAlayna  Phone number:  [phone]    Returned call at: _    Note:   Pt has appt with [facility] on 6/14 for IUD consult. Alayna is hoping to do insert same day. I did extend appt from 20 min to 40 min. I have not reached out to business office yet for coverage.     Last office visit and reason:  5/1/19 travel px DW---------------------  From: Elina Parra LPN ([facility] Message Pool ([facility]))   To: Kelley Coker;     Sent: 6/10/2019 2:29:54 PM CDT  Subject: FW: IUD consult/insert---------------------  From: Kelley Coker   To: [facility] Message Pool ([facility]);     Sent: 6/10/2019 3:12:13 PM CDT  Subject: RE: IUD consult/insert     I am fine with that  It looks like she is currently on an oral contraceptive. DON\"T stop it, continue with it until I see her for insert.Patient called back @ 8703 and was notified.Message sent to have coverage checked on IUDs.  "

## 2022-02-15 NOTE — PROGRESS NOTES
Patient:   JUAN SHAW            MRN: [id number]            FIN: [id number]               Age:   19 years     Sex:  Female     :  2000   Associated Diagnoses:   IUD check up; Mild major depression; Acne vulgaris   Author:   Don PITTMAN, Kleley      Chief Complaint   2019 12:50 PM CDT    IUD follow up/med check, also c/o lump behind L ear x1-2 months        History of Present Illness   Symptoms and concerns as expressed in CC reviewed and confirmed with patient  here for a few things  -little lump at base of right ear lobe. No new piercings, no fever, a little painful  -needs meds refilled. Likes addition of bupropion, feels more stable, requests refills, see PHQ 9 and NANDO 7  Here for IUD check, recently inserted  No bleeding or pain with IC, minimal spotting, minimal cramping  No new partner since insertion  Very pleased with her IUD    also requests acne cream, flares sometimes now that Kyleena is in place      Health Status   Allergies:    Allergic Reactions (Selected)  No Known Medication Allergies   Medications:  (Selected)   Prescriptions  Prescribed  Clindagel 1% topical gel: 1 ximena, Topical, hs, # 30 gm, 5 Refill(s), Type: Maintenance, Pharmacy: [facility] #[id number], gel or solution okay, 1 ximena Topical hs  SUMAtriptan 50 mg oral tablet: See Instructions, Instructions: 1/2 - 2 tab(s) PO Once  may repeat dose once in 2 hours, maximum of 200 mg per 24 hours, PRN: for migraine headache, # 9 tab(s), 11 Refill(s), Type: Soft Stop, Pharmacy: [id number], 1/2 - 2 tab(s) PO Onc...  Wellbutrin  mg/12 hours oral tablet, extended release: = 1 tab(s) ( 150 mg ), PO, daily, Instructions: one tab each morning, # 90 tab(s), 0 Refill(s), Type: Maintenance, Pharmacy: [facility] [id number], 1 tab(s) Oral daily,Instr:one tab each morning  buPROPion 150 mg/12 hours (SR) oral tablet, extended release: See Instructions, Instructions: TAKE ONE TABLET BY MOUTH EVERY MORNING, # 90 tab(s), 1  Refill(s), Type: Soft Stop, Pharmacy: [facility] #[id number], TAKE ONE TABLET BY MOUTH EVERY MORNING  ondansetron 4 mg oral tablet, disintegratin tab(s) ( 4 mg ), PO, q8 hrs, # 10 tab(s), 1 Refill(s), Type: Maintenance, Pharmacy: [id number], 1 tab(s) po q8 hrs  sertraline 100 mg oral tablet: = 1.5 tab(s), Oral, daily, # 135 tab(s), 1 Refill(s), Type: Maintenance, Pharmacy: [facility] #[id number]  Documented Medications  Documented  Kyleena 19.5 mg intrauterine device: = 1 EA ( 19.5 mg ), Intrauteral, once, Instructions: inserted by [facility] on 19, due for removal by 24., 0 Refill(s), Type: Maintenance  albuterol 90mcg/inhalation MDI: 0 Refill(s), Type: Maintenance   Problem list:    All Problems  Asthma / SNOMED CT 863461359 / Confirmed  Mild major depression / SNOMED CT 099953367 / Confirmed  Resolved: H/O: chickenpox / SNOMED CT 324732791      Histories   Past Medical History:    Resolved  H/O: chickenpox ([id number]):  Resolved.   Family History:    Diabetes mellitus  Grandfather (M)  Seizure  Brother  HTN - Hypertension  Grandfather (M)  Grandmother (M)     Procedure history:    Insertion of IUD (SNOMED CT 229475377) performed by Kelley Coker on 2019 at 19 Years.  Comments:  2019 2:10 PM CDT - Christine Baez  Consent form signed with [facility]. Kyleena IUD placed.    Due for removal by 2024    Lot:[id number]  Exp: 2021  Myringotomy and insertion of tympanic ventilation tube (SNOMED CT 5027916600).   Social History:        Alcohol Assessment            Current, 1-2 times per month, 4 drinks/episode average.  5 drinks/episode maximum.      Tobacco Assessment            Never      Home and Environment Assessment            Risks in environment: Gun in the home..        Physical Examination   Vital Signs   2019 12:50 PM CDT Temperature Tympanic 98.7 DegF    Peripheral Pulse Rate 80 bpm    Pulse Site Radial artery    HR Method Manual    Systolic Blood Pressure  92 mmHg    Diastolic Blood Pressure 58 mmHg  LOW    Mean Arterial Pressure 69 mmHg    BP Site Right arm    BP Method Manual      Measurements from flowsheet : Measurements   8/2/2019 12:50 PM CDT Height Measured - Standard 65.5 in    Weight Measured - Standard 125.8 lb    BSA 1.62 m2    Body Mass Index 20.61 kg/m2    Body Mass Index Percentile 39.47      General:  Alert and oriented, No acute distress, pelvic exam reveals normal external genitalia  Vault with normal rugae and no discharge  Cervix clear with IUD strings visible at os  ,     has a 1 mm mass palpable just below the right ear lobes, ear piercing is a little irritated, TM and canal are clear, no cervical lymph nodes palpable.    Neurologic:  Alert, Oriented.    Cognition and Speech:  Oriented, Speech clear and coherent.    Psychiatric:  Cooperative, Appropriate mood & affect, Normal judgment, Non-suicidal.       Impression and Plan   Diagnosis     IUD check up (WHZ23-DP Z30.431).     Mild major depression (WOT82-XI F32.0).     Acne vulgaris (VRO97-UY L70.0).     Plan:  no concerns about small 1 mm lump by ear, reactive related to piercing  refilled meds for mental health, see me in 6 months.    Patient Instructions:       Counseled: Patient, Regarding diagnosis, Regarding treatment, Regarding medications, Verbalized understanding, Answered questions  Reassured regarding placement   .    Orders     Orders (Selected)   Prescriptions  Prescribed  Clindagel 1% topical gel: 1 ximena, Topical, hs, # 30 gm, 5 Refill(s), Type: Maintenance, Pharmacy: [facility] #[id number], gel or solution okay, 1 ximena Topical hs  buPROPion 150 mg/12 hours (SR) oral tablet, extended release: See Instructions, Instructions: TAKE ONE TABLET BY MOUTH EVERY MORNING, # 90 tab(s), 1 Refill(s), Type: Soft Stop, Pharmacy: [facility] #[id number], TAKE ONE TABLET BY MOUTH EVERY MORNING  sertraline 100 mg oral tablet: = 1.5 tab(s), Oral, daily, # 135 tab(s), 1 Refill(s), Type:  Maintenance, Pharmacy: [facility] #[id number].

## 2022-02-15 NOTE — NURSING NOTE
Generalized Anxiety Disorder Screening Entered On:  3/19/2019 4:45 PM CDT    Performed On:  3/19/2019 4:45 PM CDT by Mary Pleitez MA               Generalized Anxiety Disorder Screening   NANDO Nervous, Anxious On Edge :   Nearly every day   NANDO Control Worrying B :   More than half the days   NANDO Worrying Too Much :   Nearly every day   NANDO Restless :   Nearly every day   NANDO Easily Annoyed/Irritable :   Several days   NANDO Afraid :   Not at all   NANDO Trouble Relaxing :   Several days   NANDO Total Screening Score :   13    NANDO Difficulty with Work, Home, Others :   Somewhat difficult   Mary Pleitez MA - 3/19/2019 4:45 PM CDT

## 2022-02-15 NOTE — NURSING NOTE
Comprehensive Intake Entered On:  7/17/2020 4:13 PM CDT    Performed On:  7/17/2020 4:10 PM CDT by Elina Parra LPN               Summary   Chief Complaint :   not sure why she needed appt; was told she needed f/u visit     Menstrual Status :   Menarcheal   Height/Length Estimated :   65.5 in(Converted to: 5 ft 5 in, 166.37 cm)    Elina Parra LPN - 7/17/2020 4:10 PM CDT   Health Status   Allergies Verified? :   Yes   Medication History Verified? :   Yes   Medical History Verified? :   No   Pre-Visit Planning Status :   Not completed   Tobacco Use? :   Never smoker   Elina Parra LPN - 7/17/2020 4:10 PM CDT   Meds / Allergies   (As Of: 7/17/2020 4:13:46 PM CDT)   Allergies (Active)   No Known Medication Allergies  Estimated Onset Date:   Unspecified ; Created By:   Marla Espitia CMA; Reaction Status:   Active ; Category:   Drug ; Substance:   No Known Medication Allergies ; Type:   Allergy ; Updated By:   Marla Espitia CMA; Reviewed Date:   4/5/2020 2:51 PM CDT        Medication List   (As Of: 7/17/2020 4:13:46 PM CDT)   Prescription/Discharge Order    buPROPion  :   buPROPion ; Status:   Prescribed ; Ordered As Mnemonic:   Wellbutrin  mg/24 hours oral tablet, extended release ; Simple Display Line:   150 mg, 1 tab(s), Oral, q 24 hrs, 30 tab(s), 0 Refill(s) ; Ordering Provider:   Kelley Coker; Catalog Code:   buPROPion ; Order Dt/Tm:   6/24/2020 4:17:17 PM CDT          sertraline  :   sertraline ; Status:   Prescribed ; Ordered As Mnemonic:   sertraline 100 mg oral tablet ; Simple Display Line:   1.5 tab(s), Oral, daily, 135 tab(s), 3 Refill(s) ; Ordering Provider:   Kelley Coker; Catalog Code:   sertraline ; Order Dt/Tm:   12/24/2019 10:03:38 AM CST          fluticasone nasal  :   fluticasone nasal ; Status:   Prescribed ; Ordered As Mnemonic:   Flonase 50 mcg/inh nasal spray ; Simple Display Line:   2 spray(s), Nasal, daily, 1 EA, 6 Refill(s) ; Ordering Provider:   Mary  Frandy LIMON; Catalog Code:   fluticasone nasal ; Order Dt/Tm:   1/31/2020 2:56:26 PM CST          clindamycin topical  :   clindamycin topical ; Status:   Prescribed ; Ordered As Mnemonic:   Clindagel 1% topical gel ; Simple Display Line:   1 ximena, Topical, hs, 30 gm, 5 Refill(s) ; Ordering Provider:   Kelley Coker; Catalog Code:   clindamycin topical ; Order Dt/Tm:   8/2/2019 1:15:06 PM CDT          albuterol  :   albuterol ; Status:   Prescribed ; Ordered As Mnemonic:   Proventil HFA 90 mcg/inh inhalation aerosol ; Simple Display Line:   2 puff(s), Inhale, q4-6 hrs, do not fill till she calls, 1 EA, 1 Refill(s) ; Ordering Provider:   Kelley Coker; Catalog Code:   albuterol ; Order Dt/Tm:   12/24/2019 10:04:21 AM CST          SUMAtriptan  :   SUMAtriptan ; Status:   Prescribed ; Ordered As Mnemonic:   SUMAtriptan 50 mg oral tablet ; Simple Display Line:   See Instructions, 1/2 - 2 tab(s) PO Once  may repeat dose once in 2 hours, maximum of 200 mg per 24 hours, PRN: for migraine headache, 9 tab(s), 11 Refill(s) ; Ordering Provider:   Kelley Coker; Catalog Code:   SUMAtriptan ; Order Dt/Tm:   10/11/2018 8:05:52 PM CDT            Home Meds    levonorgestrel  :   levonorgestrel ; Status:   Documented ; Ordered As Mnemonic:   Kyleena 19.5 mg intrauterine device ; Simple Display Line:   19.5 mg, 1 EA, Intrauteral, once, inserted by [facility] on 6/14/19, due for removal by 6/14/24., 0 Refill(s) ; Catalog Code:   levonorgestrel ; Order Dt/Tm:   6/14/2019 2:08:42 PM CDT            ID Risk Screen   Recent Travel History :   No recent travel   Family Member Travel History :   No recent travel   Other Exposure to Infectious Disease :   Unknown   Elina Parra LPN - 7/17/2020 4:10 PM CDT

## 2022-02-15 NOTE — NURSING NOTE
Comprehensive Intake Entered On:  12/24/2019 9:49 AM CST    Performed On:  12/24/2019 9:45 AM CST by Christine Baez               Summary   Chief Complaint :   Pt here for med check/refill   Menstrual Status :   Menarcheal   Weight Measured :   132.0 lb(Converted to: 132 lb 0 oz, 59.87 kg)    Height Measured :   65.5 in(Converted to: 5 ft 5 in, 166.37 cm)    Body Mass Index :   21.63 kg/m2   Body Surface Area :   1.66 m2   Systolic Blood Pressure :   102 mmHg   Diastolic Blood Pressure :   58 mmHg (LOW)    Mean Arterial Pressure :   73 mmHg   Peripheral Pulse Rate :   76 bpm   BP Site :   Right arm   Pulse Site :   Radial artery   BP Method :   Manual   HR Method :   Manual   Temperature Tympanic :   98.0 DegF(Converted to: 36.7 DegC)    Christine Baez - 12/24/2019 9:45 AM CST   Health Status   Allergies Verified? :   Yes   Medication History Verified? :   Yes   Medical History Verified? :   Yes   Pre-Visit Planning Status :   Completed   Tobacco Use? :   Never smoker   Christine Baez - 12/24/2019 9:45 AM CST   Consents   Consent for Immunization Exchange :   Consent Granted   Consent for Immunizations to Providers :   Consent Granted   Christine Baez - 12/24/2019 9:45 AM CST   Meds / Allergies   (As Of: 12/24/2019 9:49:58 AM CST)   Allergies (Active)   No Known Medication Allergies  Estimated Onset Date:   Unspecified ; Created By:   Marla Espitia CMA; Reaction Status:   Active ; Category:   Drug ; Substance:   No Known Medication Allergies ; Type:   Allergy ; Updated By:   Marla Espitia CMA; Reviewed Date:   12/24/2019 9:49 AM CST        Medication List   (As Of: 12/24/2019 9:49:58 AM CST)   Prescription/Discharge Order    busPIRone  :   busPIRone ; Status:   Prescribed ; Ordered As Mnemonic:   busPIRone 5 mg oral tablet ; Simple Display Line:   See Instructions, 1 tab(s) po tid for 1 week then increas to 2 tabs 3x per day, 147 EA, 1 Refill(s) ; Ordering Provider:   Kelley Coker; Catalog  Code:   busPIRone ; Order Dt/Tm:   10/25/2019 1:00:28 PM CDT          clindamycin topical  :   clindamycin topical ; Status:   Prescribed ; Ordered As Mnemonic:   Clindagel 1% topical gel ; Simple Display Line:   1 ximena, Topical, hs, 30 gm, 5 Refill(s) ; Ordering Provider:   Kelley Coker; Catalog Code:   clindamycin topical ; Order Dt/Tm:   8/2/2019 1:15:06 PM CDT          sertraline  :   sertraline ; Status:   Prescribed ; Ordered As Mnemonic:   sertraline 100 mg oral tablet ; Simple Display Line:   1.5 tab(s), Oral, daily, 135 tab(s), 1 Refill(s) ; Ordering Provider:   Kelley Coker; Catalog Code:   sertraline ; Order Dt/Tm:   8/2/2019 1:14:16 PM CDT          SUMAtriptan  :   SUMAtriptan ; Status:   Prescribed ; Ordered As Mnemonic:   SUMAtriptan 50 mg oral tablet ; Simple Display Line:   See Instructions, 1/2 - 2 tab(s) PO Once  may repeat dose once in 2 hours, maximum of 200 mg per 24 hours, PRN: for migraine headache, 9 tab(s), 11 Refill(s) ; Ordering Provider:   Kelley Coker; Catalog Code:   SUMAtriptan ; Order Dt/Tm:   10/11/2018 8:05:52 PM CDT          ondansetron  :   ondansetron ; Status:   Prescribed ; Ordered As Mnemonic:   ondansetron 4 mg oral tablet, disintegrating ; Simple Display Line:   4 mg, 1 tab(s), PO, q8 hrs, 10 tab(s), 1 Refill(s) ; Ordering Provider:   Kelley Coker; Catalog Code:   ondansetron ; Order Dt/Tm:   5/7/2018 7:00:49 PM CDT            Home Meds    levonorgestrel  :   levonorgestrel ; Status:   Documented ; Ordered As Mnemonic:   Kyleena 19.5 mg intrauterine device ; Simple Display Line:   19.5 mg, 1 EA, Intrauteral, once, inserted by NCB on 6/14/19, due for removal by 6/14/24., 0 Refill(s) ; Catalog Code:   levonorgestrel ; Order Dt/Tm:   6/14/2019 2:08:42 PM CDT          albuterol  :   albuterol ; Status:   Documented ; Ordered As Mnemonic:   albuterol 90mcg/inhalation MDI ; Catalog Code:   albuterol ; Order Dt/Tm:   12/23/2014 6:36:59 PM CST

## 2022-02-15 NOTE — LETTER
(Inserted Image. Unable to display)            April 05, 2021      [place] [address]        Dear MARLA,    Thank you for selecting [facility] for your healthcare needs.  Below you will find the results of the recent tests done at our clinic.      Here are your lab tests, Marla. The labs included tests for diabetes, thyroid, some types of anemia and HIV. All are normal. The next step would be to have a test for tuberculosis, and TB can cause night sweats.  This could be a blood test (called a Quantiferon Gold test) or a Mantoux test. Please schedule that as soon as you can, thank you.      Result Name Current Result Reference Range   Sodium Level (mmol/L)  140 4/2/2021 135 - 146   Potassium Level (mmol/L)  3.9 4/2/2021 3.5 - 5.3   Chloride Level (mmol/L)  104 4/2/2021 98 - 110   CO2 Level (mmol/L)  27 4/2/2021 20 - 32   Glucose Level (mg/dL)  88 4/2/2021 65 - 99   BUN (mg/dL)  17 4/2/2021 7 - 25   Creatinine Level (mg/dL)  0.69 4/2/2021 0.50 - 1.10   BUN/Creat Ratio  NOT APPLICABLE 4/2/2021 6 - 22   eGFR (mL/min/1.73m2)  125 4/2/2021 > OR = 60 -    eGFR  (mL/min/1.73m2)  145 4/2/2021 > OR = 60 -    Calcium Level (mg/dL)  9.2 4/2/2021 8.6 - 10.2   TSH (mIU/L)  1.99 4/2/2021    WBC  6.3 4/2/2021 3.8 - 10.8   RBC  4.45 4/2/2021 3.80 - 5.10   Hgb (gm/dL)  13.6 4/2/2021 11.7 - 15.5   Hct (%)  39.8 4/2/2021 35.0 - 45.0   MCV (fL)  89.4 4/2/2021 80.0 - 100.0   MCH (pg)  30.6 4/2/2021 27.0 - 33.0   MCHC (gm/dL)  34.2 4/2/2021 32.0 - 36.0   RDW (%)  11.9 4/2/2021 11.0 - 15.0   Platelet  222 4/2/2021 140 - 400   MPV (fL)  10.8 4/2/2021 7.5 - 12.5   HIV Ag/Ab  NON-REACTIVE 4/2/2021 NONREACTIVE - NONREACTIVE       Please contact me or my assistant at [phone] if you have any questions or concerns.     Sincerely,        ZANE JohnsNP  Family Nurse Practitioner      What do your labs mean?  Below is a glossary of commonly ordered  labs:  LDL   Bad Cholesterol   HDL   Good Cholesterol  AST/ALT   Liver Function   Cr/Creatinine   Kidney Function  Microalbumin   Kidney Function  BUN   Kidney Function  PSA   Prostate    TSH   Thyroid Hormone  HgbA1c   Diabetes Test   Hgb (Hemoglobin)   Red Blood Cells  WBC   White Blood Cell Count

## 2022-02-15 NOTE — NURSING NOTE
Depression Screening Entered On:  8/5/2019 10:12 AM CDT    Performed On:  8/2/2019 10:06 AM CDT by Irene Cobb               Depression Screening   Little Interest - Pleasure in Activities :   Not at all   Feeling Down, Depressed, Hopeless :   Not at all   Initial Depression Screen Score :   0    Trouble Falling or Staying Asleep :   More than half the days   Feeling Tired or Little Energy :   Several days   Poor Appetite or Overeating :   Not at all   Feeling Bad About Yourself :   Not at all   Trouble Concentrating :   More than half the days   Moving or Speaking Slowly :   Not at all   Thoughts Better Off Dead or Hurting Self :   Not at all   Detailed Depression Screen Score :   5    Total Depression Screen Score :   5    NANDO Difficulty with Work, Home, Others :   Not difficult at all   Irene Cobb - 8/5/2019 10:06 AM CDT

## 2022-02-15 NOTE — TELEPHONE ENCOUNTER
---------------------  From: Christine Baez   Sent: 6/11/2019 1:08:19 PM CDT  Subject: % covered by insurance     Per Wendy helms/ insurance, Mirena, Rosa, Kyleena covered at 100%

## 2022-02-15 NOTE — PROGRESS NOTES
Patient:   JUAN SHAW            MRN: [id number]            FIN: [id number]               Age:   21 years     Sex:  Female     :  2000   Associated Diagnoses:   NANDO (generalized anxiety disorder); Mild major depression   Author:   Kelley Coker      Visit Information      Date of Service: 10/28/2021 06:36 am  Performing Location: [facility]  Encounter#: [id number]      Primary Care Provider (PCP):  NONE ,       Referring Provider:  Kelley Coker    NPI# [id number]   Visit type:  Telephone Encounter.    Source of history:  Patient.    Location of patient:  _home  Call Start Time:   _1230  Call End Time:    _1235      Chief Complaint   10/28/2021 12:14 PM CDT  medication refill on Buproprion and Sertraline, has been without x1 week - verbal consent for telephone visit     _      History of Present Illness   Today's visit was conducted via telephone due to the COVID-19 pandemic. Patient's consent to telephone visit was obtained and documented.      Reason for visit:  _using sertraline and wellbutrin for depression and anxiety control. She ran out of both meds 1 weeks ago, feels poorly now but does very well when on them and doesn't want to change dose. no thoughts of self harm voiced      Impression and Plan   Diagnosis     NANDO (generalized anxiety disorder) ([id number] F41.1).     Mild major depression (VUC47-KM F32.0).     Course:  doing well , doesnt want to change dose  rxs sent, see me in 6 months, consider first annual exam with pap at that time.    Patient Instructions:       Counseled: Patient.    Orders     Orders (Selected)   Prescriptions  Prescribed  buPROPion 150 mg/24 hours (XL) oral tablet, extended release: 1 tab(s), Oral, q 24 hrs, # 90 EA, 1 Refill(s), Type: Maintenance, Pharmacy: [facility] #[id number], 1 tab(s) Oral q 24 hrs, 65.5, in, 21 14:49:00 CDT, Height Measured, 131.5, lb, 21 14:49:00 CDT, Weight Measured  sertraline 100 mg oral tablet: =  1.5 tab(s), Oral, daily, # 135 tab(s), 1 Refill(s), Type: Maintenance, Pharmacy: [facility] #[id number], 1.5 tab(s) Oral daily,x90 day(s), 65.5, in, 04/02/21 14:49:00 CDT, Height Measured, 131.5, lb, 04/02/21 14:49:00 CDT, Weight Measured.        Health Status   Allergies:    Allergic Reactions (Selected)  No Known Medication Allergies   Medications:  (Selected)   Prescriptions  Prescribed  Clindagel 1% topical gel: 1 ximena, Topical, hs, # 30 gm, 5 Refill(s), Type: Maintenance, Pharmacy: [facility] #[id number], gel or solution okay, 1 ximena Topical hs  Proventil HFA 90 mcg/inh inhalation aerosol: 2 puff(s), Inhale, q4-6 hrs, Instructions: do not fill till she calls, # 1 EA, 1 Refill(s), Type: Maintenance, Pharmacy: [facility] #[id number], 2 puff(s) Inhale q4-6 hrs,Instr:do not fill till she calls  buPROPion 150 mg/24 hours (XL) oral tablet, extended release: 1 tab(s), Oral, q 24 hrs, # 90 EA, 1 Refill(s), Type: Maintenance, Pharmacy: [facility] #[id number], 1 tab(s) Oral q 24 hrs, 65.5, in, 04/02/21 14:49:00 CDT, Height Measured, 131.5, lb, 04/02/21 14:49:00 CDT, Weight Measured  fluticasone CFC free 110 mcg/inh inhalation aerosol: = 2 puff(s), inh, bid, Instructions: rinse mouth and throat after use, # 3 EA, 0 Refill(s), Type: Maintenance, Pharmacy: [facility] #[id number], 2 puff(s) Inhale bid,Instr:rinse mouth and throat after use, 65.5, in, 04/02/21 14:49:00 CDT, Height...  sertraline 100 mg oral tablet: = 1.5 tab(s), Oral, daily, # 135 tab(s), 1 Refill(s), Type: Maintenance, Pharmacy: [facility] #[id number], 1.5 tab(s) Oral daily,x90 day(s), 65.5, in, 04/02/21 14:49:00 CDT, Height Measured, 131.5, lb, 04/02/21 14:49:00 CDT, Weight Measured  Documented Medications  Documented  Kyleena 19.5 mg intrauterine device: = 1 EA ( 19.5 mg ), Intrauteral, once, Instructions: inserted by NCB on 6/14/19, due for removal by 6/14/24., 0 Refill(s), Type: Maintenance,    Medications          *denotes  recorded medication          Proventil HFA 90 mcg/inh inhalation aerosol: 2 puff(s), Inhale, q4-6 hrs, do not fill till she calls, 1 EA, 1 Refill(s).          buPROPion 150 mg/24 hours (XL) oral tablet, extended release: 1 tab(s), Oral, q 24 hrs, 90 EA, 1 Refill(s).          Clindagel 1% topical gel: 1 ximena, Topical, hs, 30 gm, 5 Refill(s).          fluticasone CFC free 110 mcg/inh inhalation aerosol: 2 puff(s), inh, bid, rinse mouth and throat after use, 3 EA, 0 Refill(s).          *Kyleena 19.5 mg intrauterine device: 19.5 mg, 1 EA, Intrauteral, once, inserted by NCB on 6/14/19, due for removal by 6/14/24., 0 Refill(s).          sertraline 100 mg oral tablet: 1.5 tab(s), Oral, daily, for 90 day(s), 135 tab(s), 1 Refill(s).       Problem list:    All Problems  Mild major depression / SNOMED CT 158954022 / Confirmed  NANDO (generalized anxiety disorder) / SNOMED CT 26150777 / Confirmed  Asthma / SNOMED CT 436916448 / Confirmed  Acne vulgaris / SNOMED CT 117485424 / Confirmed      Histories   Past Medical History:    Resolved  H/O: chickenpox ([id number]):  Resolved.   Family History:    Diabetes mellitus  Grandfather (M)  Seizure  Brother  HTN - Hypertension  Grandfather (M)  Grandmother (M)     Procedure history:    Insertion of IUD (SNOMED CT 600353028) performed by Kelley Coker on 6/14/2019 at 19 Years.  Comments:  6/14/2019 2:10 PM LUCINDAT - Christine Baez  Consent form signed with NCB. Kyleena IUD placed.    Due for removal by 6/14/2024    Lot:[id number]  Exp: 02/2021  Myringotomy and insertion of tympanic ventilation tube (SNOMED CT 6202644839).   Social History:        Electronic Cigarette/Vaping Assessment            Electronic Cigarette Use: Former use, quit more than 90 days ago.      Alcohol Assessment            Current, 1-2 times per month, 4 drinks/episode average.  5 drinks/episode maximum.      Tobacco Assessment            Never (less than 100 in lifetime)      Home and Environment Assessment             Risks in environment: Gun in the home..

## 2022-02-15 NOTE — LETTER
(Inserted Image. Unable to display)   September 14, 2021    JUAN SHAW  [address] [place] [address]            Dear JUAN,      Thank you for selecting [facility] for your healthcare needs.    Our records indicate you are due for the following services:     Follow-up office visit.    (FYI   Regarding office visits: In some instances, a video visit or telephone visit may be offered as an option.)      To schedule an appointment or if you have further questions, please contact your clinic at [phone].      Powered by [employer]    Sincerely,    KELTON Ag

## 2022-02-15 NOTE — TELEPHONE ENCOUNTER
---------------------  From: Willie/Sydnee PINZON ([employer] Pool ([address]))   To: Phone [employer] Pool ([address]);     Sent: 3/25/2020 8:33:06 AM CDT  Subject: PA- Wellbutrin      Received PA from [facility] for Wellbutrin. While trying to fill out PA, it says that there was an error with the request due to pt name and  being wrong. Called patient at [phone]. Wanting to verify address with pt. Patient states she has a different address that what we have and that she will call us back later with the correct one so PA can be completed.---------------------  From: Summer Chamberlain CMA (Phone Messages Pool ([address]))   To: [employer] Pool ([address]);     Sent: 3/25/2020 10:26:00 AM CDT  Subject: RE: PA- Wellbutzeke       [facility], [id number] submitted.PA approval from [employer] [facility] on Bupropion 150mg XL, approved 3/25/20 - 3/25/21.  Case # [id number]    [phone] notified. Pt has already picked up.

## 2022-02-15 NOTE — NURSING NOTE
Comprehensive Intake Entered On:  8/2/2019 12:54 PM CDT    Performed On:  8/2/2019 12:50 PM CDT by Christine Baez               Summary   Chief Complaint :   IUD follow up/med check, also c/o lump behind L ear x1-2 months   Menstrual Status :   Menarcheal   Weight Measured :   125.8 lb(Converted to: 125 lb 13 oz, 57.06 kg)    Height Measured :   65.5 in(Converted to: 5 ft 5 in, 166.37 cm)    Body Mass Index :   20.61 kg/m2   Body Surface Area :   1.62 m2   Systolic Blood Pressure :   92 mmHg   Diastolic Blood Pressure :   58 mmHg (LOW)    Mean Arterial Pressure :   69 mmHg   Peripheral Pulse Rate :   80 bpm   BP Site :   Right arm   Pulse Site :   Radial artery   BP Method :   Manual   HR Method :   Manual   Temperature Tympanic :   98.7 DegF(Converted to: 37.1 DegC)    Christine Baez - 8/2/2019 12:50 PM CDT   Health Status   Allergies Verified? :   Yes   Medication History Verified? :   Yes   Medical History Verified? :   Yes   Pre-Visit Planning Status :   Completed   Tobacco Use? :   Never smoker   Christine Baez - 8/2/2019 12:50 PM CDT   Consents   Consent for Immunization Exchange :   Consent Granted   Consent for Immunizations to Providers :   Consent Granted   Christine Baez - 8/2/2019 12:50 PM CDT   Meds / Allergies   (As Of: 8/2/2019 12:54:48 PM CDT)   Allergies (Active)   No Known Medication Allergies  Estimated Onset Date:   Unspecified ; Created By:   Marla Espitia CMA; Reaction Status:   Active ; Category:   Drug ; Substance:   No Known Medication Allergies ; Type:   Allergy ; Updated By:   Marla Espitia CMA; Reviewed Date:   8/2/2019 12:54 PM CDT        Medication List   (As Of: 8/2/2019 12:54:48 PM CDT)   Prescription/Discharge Order    sertraline  :   sertraline ; Status:   Prescribed ; Ordered As Mnemonic:   sertraline 100 mg oral tablet ; Simple Display Line:   1.5 tab(s), Oral, daily, 45 tab(s), 0 Refill(s) ; Ordering Provider:   Kelley Coker; Catalog Code:   sertraline ;  Order Dt/Tm:   7/1/2019 9:29:33 AM          buPROPion  :   buPROPion ; Status:   Prescribed ; Ordered As Mnemonic:   Wellbutrin  mg/12 hours oral tablet, extended release ; Simple Display Line:   150 mg, 1 tab(s), PO, daily, one tab each morning, 90 tab(s), 0 Refill(s) ; Ordering Provider:   Kelley Coker; Catalog Code:   buPROPion ; Order Dt/Tm:   7/1/2019 9:19:37 AM          SUMAtriptan  :   SUMAtriptan ; Status:   Prescribed ; Ordered As Mnemonic:   SUMAtriptan 50 mg oral tablet ; Simple Display Line:   See Instructions, 1/2 - 2 tab(s) PO Once  may repeat dose once in 2 hours, maximum of 200 mg per 24 hours, PRN: for migraine headache, 9 tab(s), 11 Refill(s) ; Ordering Provider:   Kelley Coker; Catalog Code:   SUMAtriptan ; Order Dt/Tm:   10/11/2018 8:05:52 PM          ondansetron  :   ondansetron ; Status:   Prescribed ; Ordered As Mnemonic:   ondansetron 4 mg oral tablet, disintegrating ; Simple Display Line:   4 mg, 1 tab(s), PO, q8 hrs, 10 tab(s), 1 Refill(s) ; Ordering Provider:   Kelley Coker; Catalog Code:   ondansetron ; Order Dt/Tm:   5/7/2018 7:00:49 PM            Home Meds    levonorgestrel  :   levonorgestrel ; Status:   Documented ; Ordered As Mnemonic:   Kyleena 19.5 mg intrauterine device ; Simple Display Line:   19.5 mg, 1 EA, Intrauteral, once, inserted by NCB on 6/14/19, due for removal by 6/14/24., 0 Refill(s) ; Catalog Code:   levonorgestrel ; Order Dt/Tm:   6/14/2019 2:08:42 PM          albuterol  :   albuterol ; Status:   Documented ; Ordered As Mnemonic:   albuterol 90mcg/inhalation MDI ; Catalog Code:   albuterol ; Order Dt/Tm:   12/23/2014 6:36:59 PM

## 2022-02-15 NOTE — NURSING NOTE
CAGE Assessment Entered On:  3/19/2019 4:44 PM CDT    Performed On:  3/19/2019 4:44 PM CDT by Mary Pleitez MA               Assessment   Have you ever felt you should cut down on your drinking :   No   Have people annoyed you by criticizing your drinking :   No   Have you ever felt bad or guilty about your drinking :   No   Have you ever taken a drink first thing in the morning to steady your nerves or get rid of a hangover (Eye-opener) :   No   CAGE Score :   0    Mary Pleitez MA - 3/19/2019 4:44 PM CDT

## 2022-02-15 NOTE — NURSING NOTE
Asthma Control Test (ACT) Total Entered On:  4/2/2021 3:17 PM CDT    Performed On:  4/2/2021 3:16 PM CDT by Elina Parra LPN               Asthma Control Test (ACT) Total   Asthma Control Test Total (Adult) :   25    Asthma Action Plan Provided? :   No   Elina Parra LPN - 4/2/2021 3:16 PM CDT

## 2022-02-15 NOTE — PROGRESS NOTES
Patient:   JUAN SHAW            MRN: [id number]            FIN: [id number]               Age:   17 years     Sex:  Female     :  2000   Associated Diagnoses:   Acute viral syndrome   Author:   Adria Whitt MD      Chief Complaint   2017 8:36 AM CST    pt here for muscle aches, headaches, slept a lot yesturday, fever yesturday, symptoms began yesturday at 4-5pm, had some vomiting yesturday, has not been eating much since yesturday, has been drinking water 98.8      History of Present Illness   see chief complaint as noted above and confirmed with the patient     17 year old female here today not feeling well, she stayed home from school yesturday as she was having some muscle aches so she slept, she slept most of the day, around 4pm she devloped symptoms of headache, fever, and continuous muscle aches. She did vomit a few times yesturday and has not eaten much since then, she has been drinking water. She also mention's today her throat hurt's, she went to sleep around 6pm last night and slept throughout the night and woke up around 8am this morning.       Review of Systems   Constitutional:  Fever.    Ear/Nose/Mouth/Throat:  Nasal congestion, Sore throat.    Respiratory:  Shortness of breath, No cough, No wheezing.    Cardiovascular:  No chest pain.    Gastrointestinal:  Nausea, Vomiting, No diarrhea, No constipation, No abdominal pain.    Genitourinary:  No dysuria, No hematuria.    Musculoskeletal:  Muscle pain, muscle aches and pains .    Integumentary:  No rash.    Neurologic:  Headache.             Health Status   Allergies:    Allergic Reactions (Selected)  No Known Medication Allergies   Medications:  (Selected)   Prescriptions  Prescribed  SUMAtriptan 50 mg oral tablet: See Instructions, Instructions: 1/2 - 2 tab(s) PO Once  may repeat dose once in 2 hours, maximum of 200 mg per 24 hours, PRN: for migraine headache, # 9 tab(s), 11 Refill(s), Type: Soft Stop, Pharmacy: [facility]  [id number], 1/2 - 2 tab(s) PO Onc...  Documented Medications  Documented  Advair Diskus 250 mcg-50 mcg inhalation powder: 2 puff(s), inh, bid, 0 Refill(s), Type: Maintenance  Flovent  mcg/inh inhalation aerosol: 2 puff(s), inh, bid, 0 Refill(s), Type: Maintenance  Karen 3 mg-0.03 mg oral tablet: 1 tab(s), po, daily, 0 Refill(s), Type: Maintenance  albuterol 90mcg/inhalation MDI: 0 Refill(s), Type: Maintenance  amitriptyline 25 mg oral tablet: 2 tab(s) ( 50 mg ), po, hs, 0 Refill(s), Type: Maintenance   Problem list:    All Problems  Asthma / SNOMED CT 143800215 / Confirmed      Histories   Past Medical History:    No active or resolved past medical history items have been selected or recorded.   Family History:    Diabetes mellitus  Grandfather (M)  Seizure  Brother  HTN - Hypertension  Grandfather (M)  Grandmother (M)     Procedure history:    No active procedure history items have been selected or recorded.   Social History:        Alcohol Assessment            Never      Tobacco Assessment            Never      Home and Environment Assessment            Risks in environment: Gun in the home..        Physical Examination   Vital Signs   11/9/2017 8:36 AM CST Temperature Tympanic 98.8 DegF    Peripheral Pulse Rate 146 bpm  HI    Pulse Site Radial artery    Systolic Blood Pressure 112 mmHg    Diastolic Blood Pressure 70 mmHg    Mean Arterial Pressure 84 mmHg    BP Site Right arm    Oxygen Saturation 98 %      Measurements from flowsheet : Measurements   11/9/2017 8:36 AM CST Height Measured - Standard 65 in    Weight Measured - Standard 123.8 lb    BSA 1.6 m2    Body Mass Index 20.6 kg/m2    Body Mass Index Percentile 47.37      General:  Alert and oriented, No acute distress.    Eye:  Pupils are equal, round and reactive to light, Normal conjunctiva.    HENT:  Oral mucosa is moist.    Neck:  Supple.    Respiratory:  Respirations are non-labored.    Cardiovascular:  Normal rate, Regular rhythm, No edema.     Gastrointestinal:  Non-distended.    Musculoskeletal:  Normal gait.    Integumentary:  Warm, No rash.    Psychiatric:  Cooperative, Appropriate mood & affect, Normal judgment.       Review / Management   Results review:  Lab results   11/9/2017 9:12 AM CST WBC 6.8    RBC 5.22    Hgb 15.6 g/dL    Hct 44.6 %    MCV 85 fL    MCH 29.9 pg    MCHC 35.0 g/dL    RDW 12.9 %    Platelet 186    MPV 10.5 fL    Heterophile Ab Negative    Influenza Ag Negative for Influenza A antigen; Negative for Influenza B antigen   11/9/2017 9:04 AM CST Group A Strep POC NOT DETECTED   .       Impression and Plan       Diagnosis     Acute viral syndrome (LXT34-CH B34.9).     Summary:  Discussed results with mom-all test's came back negative, mom understood, most likely a virus. She will lay low, drink plenty of fluids and will return if not getting better. Did inform mom that the Lymes test is not back yet, will let her know when it is back. .    I, Mary Pleitez Medical Assistant acted solely as a scribe for, and in presence of Dr. Adria Whitt who performed the services.

## 2022-02-15 NOTE — TELEPHONE ENCOUNTER
---------------------  From: Kalina Hudson LPN (Phone Messages Pool ([id number]_[facility]))   To: [facility] Message Pool ([id number]_[facility]);     Sent: 7/1/2019 9:34:28 AM CDT  Subject: anxiety and depression meds        Phone Message    PCP:         Time of Call:  0853       Person Calling:  Patients imtiaz Catalan    Phone number:  [phone] ok     Returned call at: 0925    Note:   Mom states patient is leaving on Friday and won't be back until the 20th.  Mom states they know that she is due for visit but won't be in until after trip.  They are requesting refill for one month.  Per note in chart [facility] okayed 90 day supply be sent in on 06/21/2019.  Sent in per [facility] note and 30 day supply of sertraline sent per protocol for patient.  Called and notified patient.  Patient verbalized understanding and will make appt. when she returns.      Last office visit and reason:  06/14/2019 Jessie White.---------------------  From: Elina Parra LPN (Next audience Message Pool ([id number]_[facility]))   To: Kelley Coker;     Sent: 7/1/2019 2:02:43 PM CDT  Subject: FW: anxiety and depression medsnoted

## 2022-02-15 NOTE — NURSING NOTE
CAGE Assessment Entered On:  10/29/2019 7:19 AM CDT    Performed On:  10/25/2019 7:19 AM CDT by Sven April               Assessment   Have you ever felt you should cut down on your drinking :   No   Have people annoyed you by criticizing your drinking :   No   Have you ever felt bad or guilty about your drinking :   No   Have you ever taken a drink first thing in the morning to steady your nerves or get rid of a hangover (Eye-opener) :   No   CAGE Score :   0    Sven April - 10/29/2019 7:19 AM CDT

## 2022-02-15 NOTE — PROGRESS NOTES
Patient:   MARLA SHAW            MRN: [id number]            FIN: [id number]               Age:   19 years     Sex:  Female     :  2000   Associated Diagnoses:   Sore throat   Author:   Kelley Coker      Visit Information      Date of Service: 2020 08:56 am  Performing Location: [facility]  Encounter#: [id number]      Primary Care Provider (PCP):  NONE ,       Referring Provider:  Kelley Coker    NPI# [id number]      Chief Complaint   2020 9:04 AM CST     wisdom teeth removed last Friday, cough started , and throat started to hurt yesterday, feels like there is something stuck in her throat, fever        History of Present Illness   Marla had wisdom teeth extraction5 days ago, doing well except 3 days ago onset of a head cold and sore throat and yesterday fever 100.3 and throat still sore. Used one vicodin and felt horrible, last used tylenol about 9 hours ago . PO intake ok but she has lost #5 since surgery.  no emesis or diarrhea, no ear pain, no SOB      Health Status   Allergies:    Allergic Reactions (Selected)  No Known Medication Allergies   Medications:  (Selected)   Prescriptions  Prescribed  Clindagel 1% topical gel: 1 ximena, Topical, hs, # 30 gm, 5 Refill(s), Type: Maintenance, Pharmacy: [facility] #[id number], gel or solution okay, 1 ximena Topical hs  Proventil HFA 90 mcg/inh inhalation aerosol: 2 puff(s), Inhale, q4-6 hrs, Instructions: do not fill till she calls, # 1 EA, 1 Refill(s), Type: Maintenance, Pharmacy: [facility] #[id number], 2 puff(s) Inhale q4-6 hrs,Instr:do not fill till she calls  SUMAtriptan 50 mg oral tablet: See Instructions, Instructions: 1/2 - 2 tab(s) PO Once  may repeat dose once in 2 hours, maximum of 200 mg per 24 hours, PRN: for migraine headache, # 9 tab(s), 11 Refill(s), Type: Soft Stop, Pharmacy: [id number], 1/2 - 2 tab(s) PO Onc...  Wellbutrin  mg/24 hours oral tablet, extended release: = 1  tab(s) ( 150 mg ), Oral, q 24 hrs, # 90 EA, 1 Refill(s), Type: Maintenance, Pharmacy: [facility] #[id number], 1 tab(s) Oral q 24 hrs  ondansetron 4 mg oral tablet, disintegratin tab(s) ( 4 mg ), PO, q8 hrs, # 10 tab(s), 1 Refill(s), Type: Maintenance, Pharmacy: [id number], 1 tab(s) po q8 hrs  sertraline 100 mg oral tablet: = 1.5 tab(s), Oral, daily, # 135 tab(s), 3 Refill(s), Type: Maintenance, Pharmacy: [facility] #[id number], 1.5 tab(s) Oral daily  Documented Medications  Documented  Kyleena 19.5 mg intrauterine device: = 1 EA ( 19.5 mg ), Intrauteral, once, Instructions: inserted by [facility] on 19, due for removal by 24., 0 Refill(s), Type: Maintenance   Problem list:    All Problems  Acne vulgaris / SNOMED CT 015548977 / Confirmed  Asthma / SNOMED CT 246213603 / Confirmed  NANDO (generalized anxiety disorder) / SNOMED CT 76156198 / Confirmed  Mild major depression / SNOMED CT 794428731 / Confirmed  Resolved: H/O: chickenpox / SNOMED CT 646325997      Histories   Past Medical History:    Resolved  H/O: chickenpox (051156488):  Resolved.   Family History:    Diabetes mellitus  Grandfather (M)  Seizure  Brother  HTN - Hypertension  Grandfather (M)  Grandmother (M)     Procedure history:    Insertion of IUD (SNOMED CT 962323683) performed by Kelley Coker on 2019 at 19 Years.  Comments:  2019 2:10 PM LUC[facility]AT - Christine Baez  Consent form signed with [facility]. Kyleena IUD placed.    Due for removal by 2024    Lot:[id number]  Exp: 2021  Myringotomy and insertion of tympanic ventilation tube (SNOMED CT 1497476228).   Social History:        Alcohol Assessment            Current, 1-2 times per month, 4 drinks/episode average.  5 drinks/episode maximum.      Tobacco Assessment            Never      Home and Environment Assessment            Risks in environment: Gun in the home..        Physical Examination   Vital Signs   2020 9:04 AM CST Temperature Tympanic 98.8 DegF     Peripheral Pulse Rate 96 bpm    Systolic Blood Pressure 94 mmHg    Diastolic Blood Pressure 58 mmHg  LOW    Mean Arterial Pressure 70 mmHg    BP Site Right arm    BP Method Manual    Oxygen Saturation 99 %      Measurements from flowsheet : Measurements   1/8/2020 9:04 AM CST     Weight Measured - Standard                126.8 lb     General:  Alert and oriented, No acute distress.    Eye:  Normal conjunctiva.    HENT:  Tympanic membranes are clear, Normal hearing, Oral mucosa is moist, No pharyngeal erythema, sutures in gums visible, no swelling or inflamation noted at surgical site.    Neck:  Supple, Non-tender, No lymphadenopathy.    Respiratory:  Lungs are clear to auscultation, Respirations are non-labored, Breath sounds are equal, Symmetrical chest wall expansion.    Cardiovascular:  Normal rate, Regular rhythm, No murmur.    Musculoskeletal:  Normal range of motion, Normal gait.    Integumentary:  Warm, Dry, Pink, No rash.    Neurologic:  Alert, Oriented.    Psychiatric:  Cooperative.       Review / Management   Results review:  Lab results: 1/8/2020 9:38 AM CST     Group A Strep POC         NOT DETECTED  .       Impression and Plan   Diagnosis     Sore throat (ISE64-OE J02.9).     Plan:  likely viral  she has f/u in two days with surgeon, certainly if fever recurs and persists she should call or return.    Patient Instructions:       Counseled: Patient, Regarding diagnosis, Regarding treatment, Regarding medications, Verbalized understanding.

## 2022-02-15 NOTE — PROGRESS NOTES
Patient:   JUAN SHAW            MRN: [id number]            FIN: [id number]               Age:   20 years     Sex:  Female     :  2000   Associated Diagnoses:   Suspected COVID-19 virus infection   Author:   Aba Hanley MD      Visit Information      Date of Service: 2020 10:21 am  Performing Location: [facility]  Encounter#: [id number]      Primary Care Provider (PCP):  NONE ,       Referring Provider:  Aba Hanley MD    NPI# [id number]   Visit type:  Video Visit via Doximity.    Participants in room during visit:  Patient  Location of Patient: Home  Location of provider:  [facility] (Clinic office or Home office)  Video Start Time:  1030  Video End Time:   1040        Today's visit was conducted via video conference due to the COVID-19 pandemic.  The patient's consent to proceed with a video visit has been obtained and documented.      Chief Complaint   2020 10:29 AM CDT   SOB, Chest pain, Fatigue started on Monday; verbal permission for video visit        History of Present Illness   Patient is a 20 year old F who is being evaluated via a billable video visit.    Covid sx of Fatigue, SOB.  No resp distress.  Works for [email or web address].  Advised to stay home until test returns.       Review of Systems   Constitutional:  Negative.    Eye:  Negative.    Ear/Nose/Mouth/Throat:  Negative.    Respiratory:  Negative.    Cardiovascular:  Negative.    Gastrointestinal:  Negative.    Genitourinary:  Negative.    Immunologic:  Negative.    Musculoskeletal:  Negative.    Integumentary:  Negative.    Neurologic:  Negative.    Psychiatric:  Negative.       Health Status   Allergies:    Allergic Reactions (Selected)  No Known Medication Allergies   Medications:  (Selected)   Prescriptions  Prescribed  Clindagel 1% topical gel: 1 ximena, Topical, hs, # 30 gm, 5 Refill(s), Type: Maintenance, Pharmacy: [facility] #[id number], gel or solution okay, 1 ximena Topical hs  Proventil  HFA 90 mcg/inh inhalation aerosol: 2 puff(s), Inhale, q4-6 hrs, Instructions: do not fill till she calls, # 1 EA, 1 Refill(s), Type: Maintenance, Pharmacy: [facility] #[id number], 2 puff(s) Inhale q4-6 hrs,Instr:do not fill till she calls  SUMAtriptan 50 mg oral tablet: See Instructions, Instructions: 1/2 - 2 tab(s) PO Once  may repeat dose once in 2 hours, maximum of 200 mg per 24 hours, PRN: for migraine headache, # 9 tab(s), 11 Refill(s), Type: Soft Stop, Pharmacy: [id number], 1/2 - 2 tab(s) PO Onc...  Wellbutrin  mg/24 hours oral tablet, extended release: = 1 tab(s) ( 150 mg ), Oral, q 24 hrs, # 90 tab(s), 3 Refill(s), Type: Maintenance, Pharmacy: [facility] #[id number], 1 tab(s) Oral q 24 hrs, 65.5, in, 04/05/20 14:50:00 CDT, Height Measured, Weight Measured  sertraline 100 mg oral tablet: = 1.5 tab(s), Oral, daily, # 135 tab(s), 3 Refill(s), Type: Maintenance, Pharmacy: [facility] #[id number], 1.5 tab(s) Oral daily, 65.5, in, 04/05/20 14:50:00 CDT, Height Measured, 126, lb, 01/31/20 14:48:00 CST, Weight Measured  Documented Medications  Documented  Kyleena 19.5 mg intrauterine device: = 1 EA ( 19.5 mg ), Intrauteral, once, Instructions: inserted by [facility] on 6/14/19, due for removal by 6/14/24., 0 Refill(s), Type: Maintenance   Problem list:    All Problems  Mild major depression / SNOMED CT 775169049 / Confirmed  Acne vulgaris / SNOMED CT 338218843 / Confirmed  Asthma / SNOMED CT 630176662 / Confirmed  NANDO (generalized anxiety disorder) / SNOMED CT 51569489 / Confirmed      Histories   Past Medical History:    Resolved  H/O: chickenpox (SNOMED CT 018924903):  Resolved.   Family History:    Diabetes mellitus  Grandfather (M)  Seizure  Brother  HTN - Hypertension  Grandfather (M)  Grandmother (M)     Procedure history:    Insertion of IUD (SNOMED CT 804984309) performed by Kelley Coker on 6/14/2019 at 19 Years.  Comments:  6/14/2019 2:10 PM [facility] - Sabino JOHN,  Christine  Consent form signed with [facility]. Jessie IUD placed.    Due for removal by 6/14/2024    Lot:[id number]  Exp: 02/2021  Myringotomy and insertion of tympanic ventilation tube (SNOMED CT 5740639991).   Social History:        Alcohol Assessment            Current, 1-2 times per month, 4 drinks/episode average.  5 drinks/episode maximum.      Tobacco Assessment            Never      Home and Environment Assessment            Risks in environment: Gun in the home..        Physical Examination   Measurements from flowsheet : Measurements   7/23/2020 10:29 AM CDT   Height/Length Estimated   65.5 in     General:  Alert and oriented, No acute distress.    Eye:  Pupils are equal, round and reactive to light, Normal conjunctiva.    HENT:  Oral mucosa is moist.    Neck:  Supple.    Respiratory:  Respirations are non-labored.    Psychiatric:  Cooperative, Appropriate mood & affect, Normal judgment.       Impression and Plan   Diagnosis     Suspected COVID-19 virus infection (VNY44-CP R68.89).     Course:  Not improving.    Orders     Orders   Lab (Gen Lab  Reference Lab):  SARS-CoV-2 RNA (COVID-19), Qualitative NAAT* ([facility]) (Order): Specimen Type: Nasopharyngeal Swab, Collection Date: 7/23/2020 10:43 AM CDT.

## 2022-02-15 NOTE — NURSING NOTE
Comprehensive Intake Entered On:  4/2/2021 2:54 PM CDT    Performed On:  4/2/2021 2:49 PM CDT by Elina Parra LPN               Summary   Chief Complaint :   having night sweats for the past 6 months, wakes and feels like she has wet herself, increased fatigue, sleeps late, always tired   Menstrual Status :   Menarcheal   Weight Measured :   131.5 lb(Converted to: 131 lb 8 oz, 59.647 kg)    Height Measured :   65.5 in(Converted to: 5 ft 5 in, 166.37 cm)    Body Mass Index :   21.55 kg/m2   Body Surface Area :   1.66 m2   Height/Length Estimated :   65.5 in(Converted to: 5 ft 5 in, 166.37 cm)    Systolic Blood Pressure :   96 mmHg   Diastolic Blood Pressure :   60 mmHg   Mean Arterial Pressure :   72 mmHg   Peripheral Pulse Rate :   75 bpm   BP Site :   Right arm   BP Method :   Manual   Temperature Tympanic :   98.3 DegF(Converted to: 36.8 DegC)    Oxygen Saturation :   97 %   Elina Parra LPN - 4/2/2021 2:49 PM CDT   Health Status   Allergies Verified? :   Yes   Medication History Verified? :   Yes   Medical History Verified? :   No   Pre-Visit Planning Status :   Completed   Tobacco Use? :   Never smoker   Elina Parra LPN - 4/2/2021 2:49 PM CDT   Meds / Allergies   (As Of: 4/2/2021 2:54:03 PM CDT)   Allergies (Active)   No Known Medication Allergies  Estimated Onset Date:   Unspecified ; Created By:   Marla Espitia CMA; Reaction Status:   Active ; Category:   Drug ; Substance:   No Known Medication Allergies ; Type:   Allergy ; Updated By:   Marla Espitia CMA; Reviewed Date:   7/23/2020 10:31 AM CDT        Medication List   (As Of: 4/2/2021 2:54:03 PM CDT)   Prescription/Discharge Order    buPROPion  :   buPROPion ; Status:   Prescribed ; Ordered As Mnemonic:   Wellbutrin  mg/24 hours oral tablet, extended release ; Simple Display Line:   150 mg, 1 tab(s), Oral, q 24 hrs, 90 tab(s), 3 Refill(s) ; Ordering Provider:   Kelley Coker; Catalog Code:   buPROPion ; Order Dt/Tm:    7/17/2020 4:41:28 PM CDT          sertraline  :   sertraline ; Status:   Prescribed ; Ordered As Mnemonic:   sertraline 100 mg oral tablet ; Simple Display Line:   1.5 tab(s), Oral, daily, 135 tab(s), 3 Refill(s) ; Ordering Provider:   Kelley Coker; Catalog Code:   sertraline ; Order Dt/Tm:   7/17/2020 4:41:48 PM CDT          clindamycin topical  :   clindamycin topical ; Status:   Prescribed ; Ordered As Mnemonic:   Clindagel 1% topical gel ; Simple Display Line:   1 ximena, Topical, hs, 30 gm, 5 Refill(s) ; Ordering Provider:   Kelley Coker; Catalog Code:   clindamycin topical ; Order Dt/Tm:   8/2/2019 1:15:06 PM CDT          albuterol  :   albuterol ; Status:   Prescribed ; Ordered As Mnemonic:   Proventil HFA 90 mcg/inh inhalation aerosol ; Simple Display Line:   2 puff(s), Inhale, q4-6 hrs, do not fill till she calls, 1 EA, 1 Refill(s) ; Ordering Provider:   Kelley Coker; Catalog Code:   albuterol ; Order Dt/Tm:   12/24/2019 10:04:21 AM CST          SUMAtriptan  :   SUMAtriptan ; Status:   Prescribed ; Ordered As Mnemonic:   SUMAtriptan 50 mg oral tablet ; Simple Display Line:   See Instructions, 1/2 - 2 tab(s) PO Once  may repeat dose once in 2 hours, maximum of 200 mg per 24 hours, PRN: for migraine headache, 9 tab(s), 11 Refill(s) ; Ordering Provider:   Kelley Coker; Catalog Code:   SUMAtriptan ; Order Dt/Tm:   10/11/2018 8:05:52 PM CDT            Home Meds    levonorgestrel  :   levonorgestrel ; Status:   Documented ; Ordered As Mnemonic:   Kyleena 19.5 mg intrauterine device ; Simple Display Line:   19.5 mg, 1 EA, Intrauteral, once, inserted by [facility] on 6/14/19, due for removal by 6/14/24., 0 Refill(s) ; Catalog Code:   levonorgestrel ; Order Dt/Tm:   6/14/2019 2:08:42 PM CDT            ID Risk Screen   Recent Travel History :   No recent travel   Family Member Travel History :   No recent travel   Other Exposure to Infectious Disease :   Unknown   COVID-19 Testing Status :   No positive  COVID-19 test   Elina Parra LPN - 4/2/2021 2:49 PM CDT   Social History   Social History   (As Of: 4/2/2021 2:54:03 PM CDT)   Alcohol:        Current, 1-2 times per month, 4 drinks/episode average.  5 drinks/episode maximum.   (Last Updated: 9/11/2018 9:09:01 AM CDT by Annetta Mi)          Tobacco:        Never (less than 100 in lifetime)   (Last Updated: 4/2/2021 2:51:45 PM CDT by Elina Parra LPN)          Electronic Cigarette/Vaping:        Electronic Cigarette Use: Former use, quit more than 90 days ago.   (Last Updated: 4/2/2021 2:51:58 PM CDT by Elina Parra LPN)          Home/Environment:        Risks in environment: Gun in the home..   (Last Updated: 12/31/2014 1:53:04 PM CST by Pita Romero)

## 2022-02-15 NOTE — NURSING NOTE
Depression Screening Entered On:  12/27/2019 9:35 AM CST    Performed On:  12/24/2019 9:35 AM CST by Irene Cobb               Depression Screening   Little Interest - Pleasure in Activities :   Several days   Feeling Down, Depressed, Hopeless :   More than half the days   Initial Depression Screen Score :   3    Trouble Falling or Staying Asleep :   Not at all   Feeling Tired or Little Energy :   More than half the days   Poor Appetite or Overeating :   More than half the days   Feeling Bad About Yourself :   Not at all   Trouble Concentrating :   Nearly every day   Moving or Speaking Slowly :   Not at all   Thoughts Better Off Dead or Hurting Self :   Not at all   Detailed Depression Screen Score :   7    Total Depression Screen Score :   10    NANDO Difficulty with Work, Home, Others :   Very difficult   Irene Cobb - 12/27/2019 9:35 AM CST

## 2022-02-15 NOTE — LETTER
(Inserted Image. Unable to display)     July 29, 2019      JUAN SHAW  [address], [place] [id number]          Dear JUAN,      Thank you for selecting [facility] (previously [facility]) for your healthcare needs.      Our records indicate you are due for the following services:     Follow-up office visit.      To schedule an appointment or if you have further questions, please contact your primary clinic:   [facility]       [phone]   [facility]       [phone]              [facility]     [phone]      Powered by [employer] and [employer]    Sincerely,    ZANE AgNP

## 2022-02-15 NOTE — NURSING NOTE
Depression Screening Entered On:  3/19/2019 4:45 PM CDT    Performed On:  3/19/2019 4:44 PM CDT by Mary Pleitez MA               Depression Screening   Feeling Down, Depressed, Hopeless :   Not at all   Little Interest - Pleasure in Activities :   Not at all   Initial Depression Screen Score :   0    Trouble Falling or Staying Asleep :   Nearly every day   Feeling Tired or Little Energy :   Nearly every day   Poor Appetite or Overeating :   Nearly every day   Feeling Bad About Yourself :   Several days   Trouble Concentrating :   Nearly every day   Moving or Speaking Slowly :   Nearly every day   Thoughts Better Off Dead or Hurting Self :   Not at all   Detailed Depression Screen Score :   16    Total Depression Screen Score :   16    NANDO Difficulty with Work, Home, Others :   Somewhat difficult   Mary Pleitez MA - 3/19/2019 4:44 PM CDT

## 2022-02-15 NOTE — PROGRESS NOTES
Patient:   JUAN SHAW            MRN: [id number]            FIN: [id number]               Age:   19 years     Sex:  Female     :  2000   Associated Diagnoses:   NANDO (generalized anxiety disorder); Mild intermittent asthma; Mild major depression   Author:   Kelley Coker      Chief Complaint   2019 9:45 AM CST   Pt here for med check/refill        History of Present Illness     PHQ and CAGE scoring reviewed with pt  she is Freshman at [employer], doing well.  Some increased stressors with roommate, boyfriend is supportive  she tried buspar in addition to her sertraline but too difficult to remember to take it  she was on sertraline and bupropion 150 SR, loved that but it just 'wasn't enough'  her stressors of anxiety are not  predictable, happens daily  depression seems well controlled    here for asthma check  rarely needs albuterol  hasn't used steroid inhaler for a long time  see ACT score 25  had flu shot this fall at school      Review of Systems   All other systems.     Health Status   Allergies:    Allergic Reactions (Selected)  No Known Medication Allergies   Medications:  (Selected)   Prescriptions  Prescribed  Clindagel 1% topical gel: 1 ximena, Topical, hs, # 30 gm, 5 Refill(s), Type: Maintenance, Pharmacy: [facility] #[id number], gel or solution okay, 1 ximena Topical hs  Proventil HFA 90 mcg/inh inhalation aerosol: 2 puff(s), Inhale, q4-6 hrs, Instructions: do not fill till she calls, # 1 EA, 1 Refill(s), Type: Maintenance, Pharmacy: [facility] #[id number], 2 puff(s) Inhale q4-6 hrs,Instr:do not fill till she calls  SUMAtriptan 50 mg oral tablet: See Instructions, Instructions: 1/2 - 2 tab(s) PO Once  may repeat dose once in 2 hours, maximum of 200 mg per 24 hours, PRN: for migraine headache, # 9 tab(s), 11 Refill(s), Type: Soft Stop, Pharmacy: [facility] [id number], 1/2 - 2 tab(s) PO Onc...  Wellbutrin  mg/24 hours oral tablet, extended release: = 1 tab(s) ( 150 mg ),  Oral, q 24 hrs, # 90 EA, 1 Refill(s), Type: Maintenance, Pharmacy: [facility] #[id number], 1 tab(s) Oral q 24 hrs  busPIRone 5 mg oral tablet: See Instructions, Instructions: 1 tab(s) po tid for 1 week then increas to 2 tabs 3x per day, # 147 EA, 1 Refill(s), Type: Maintenance, Pharmacy: [facility] #[id number], 1 tab(s) po tid for 1 week then increas to 2 tabs 3x per day  ondansetron 4 mg oral tablet, disintegratin tab(s) ( 4 mg ), PO, q8 hrs, # 10 tab(s), 1 Refill(s), Type: Maintenance, Pharmacy: [id number], 1 tab(s) po q8 hrs  sertraline 100 mg oral tablet: = 1.5 tab(s), Oral, daily, # 135 tab(s), 3 Refill(s), Type: Maintenance, Pharmacy: [facility] #[id number], 1.5 tab(s) Oral daily  Documented Medications  Documented  Kyleena 19.5 mg intrauterine device: = 1 EA ( 19.5 mg ), Intrauteral, once, Instructions: inserted by NCB on 19, due for removal by 24., 0 Refill(s), Type: Maintenance   Problem list:    All Problems  Acne vulgaris / SNOMED CT 530267122 / Confirmed  Asthma / SNOMED CT 688982034 / Confirmed  Mild major depression / SNOMED CT 196815022 / Confirmed  Resolved: H/O: chickenpox / SNOMED CT 561935822      Histories   Past Medical History:    Resolved  H/O: chickenpox (313128860):  Resolved.   Family History:    Diabetes mellitus  Grandfather (M)  Seizure  Brother  HTN - Hypertension  Grandfather (M)  Grandmother (M)     Procedure history:    Insertion of IUD (SNOMED CT 365056074) performed by Kelley Coker on 2019 at 19 Years.  Comments:  2019 2:10 PM LUCINDAT - Christine Baez  Consent form signed with NCB. Kyleena IUD placed.    Due for removal by 2024    Lot:[id number]  Exp: 2021  Myringotomy and insertion of tympanic ventilation tube (SNOMED CT 4545460482).   Social History:        Alcohol Assessment            Current, 1-2 times per month, 4 drinks/episode average.  5 drinks/episode maximum.      Tobacco Assessment            Never       Home and Environment Assessment            Risks in environment: Gun in the home..        Physical Examination   Vital Signs   12/24/2019 9:45 AM CST Temperature Tympanic 98.0 DegF    Peripheral Pulse Rate 76 bpm    Pulse Site Radial artery    HR Method Manual    Systolic Blood Pressure 102 mmHg    Diastolic Blood Pressure 58 mmHg  LOW    Mean Arterial Pressure 73 mmHg    BP Site Right arm    BP Method Manual      Measurements from flowsheet : Measurements   12/24/2019 9:45 AM CST Height Measured - Standard 65.5 in    Weight Measured - Standard 132.0 lb    BSA 1.66 m2    Body Mass Index 21.63 kg/m2    Body Mass Index Percentile 51.46      General:  Alert and oriented, No acute distress, good eye contact, engaging with conversation.    Eye:  Normal conjunctiva.    HENT:  Normal hearing.    Neck:  Supple, Non-tender, No lymphadenopathy.    Respiratory:  Lungs are clear to auscultation, Respirations are non-labored, Breath sounds are equal.    Cardiovascular:  Normal rate, Regular rhythm, No murmur, No gallop.    Musculoskeletal:  Normal gait.    Integumentary:  Warm, Dry, Pink.    Neurologic:  Alert, Oriented, Normal sensory, Normal motor function, No focal deficits.    Psychiatric:  Cooperative, Appropriate mood & affect, Normal judgment, Non-suicidal.       Impression and Plan   Diagnosis     NANDO (generalized anxiety disorder) (DQZ70-CO F41.1).     Mild intermittent asthma (BMP34-SO J45.20).     Mild major depression (IKW60-KX F32.0).     Patient Instructions:  Lifestyle risk factors.         Limitations: Alcohol consumption.         Counseled: Patient, Regarding diagnosis, Regarding treatment, Regarding medications, Diet, Activity, Verbalized understanding, will switch back to wellbutrin but do XL formulation, stop buspar  continue sertraline  see ACT and AAP  meds refills.    Orders     Orders (Selected)   Prescriptions  Prescribed  Proventil HFA 90 mcg/inh inhalation aerosol: 2 puff(s), Inhale, q4-6 hrs,  Instructions: do not fill till she calls, # 1 EA, 1 Refill(s), Type: Maintenance, Pharmacy: [facility] #[id number], 2 puff(s) Inhale q4-6 hrs,Instr:do not fill till she calls  Wellbutrin  mg/24 hours oral tablet, extended release: = 1 tab(s) ( 150 mg ), Oral, q 24 hrs, # 90 EA, 1 Refill(s), Type: Maintenance, Pharmacy: [facility] #[id number], 1 tab(s) Oral q 24 hrs  sertraline 100 mg oral tablet: = 1.5 tab(s), Oral, daily, # 135 tab(s), 3 Refill(s), Type: Maintenance, Pharmacy: [facility] #[id number], 1.5 tab(s) Oral daily.

## 2022-02-15 NOTE — NURSING NOTE
Depression Screening Entered On:  4/2/2021 3:15 PM CDT    Performed On:  4/2/2021 3:15 PM CDT by Elina Parra LPN               Depression Screening   Little Interest - Pleasure in Activities :   Several days   Feeling Down, Depressed, Hopeless :   Not at all   Initial Depression Screen Score :   1 Score   Poor Appetite or Overeating :   Not at all   Trouble Falling or Staying Asleep :   More than half the days   Feeling Tired or Little Energy :   More than half the days   Feeling Bad About Yourself :   Not at all   Trouble Concentrating :   Nearly every day   Moving or Speaking Slowly :   Not at all   Thoughts Better Off Dead or Hurting Self :   Not at all   Difficulty at Work, Home, Getting Along :   Somewhat difficult   Detailed Depression Screen Score :   7    Total Depression Screen Score :   8    Elina Parra LPN - 4/2/2021 3:15 PM CDT

## 2022-02-15 NOTE — NURSING NOTE
Generalized Anxiety Disorder Screening Entered On:  12/27/2019 9:36 AM CST    Performed On:  12/24/2019 9:35 AM CST by Irene Cobb               Generalized Anxiety Disorder Screening   NANDO Nervous, Anxious On Edge :   More than half the days   NANDO Control Worrying B :   More than half the days   NANDO Worrying Too Much :   Nearly every day   NANDO Restless :   Several days   NANDO Easily Annoyed/Irritable :   Nearly every day   NANDO Afraid :   Not at all   NANDO Trouble Relaxing :   Nearly every day   NANDO Total Screening Score :   14    NANDO Difficulty with Work, Home, Others :   Very difficult   Irene Cobb - 12/27/2019 9:35 AM CST

## 2022-02-15 NOTE — PROGRESS NOTES
Chief Complaint        Depression/anxiety med check, stopped taking bupropion approx 1 month ago due to depression getting worse      History of Present Illness           4//19/2018    The patient is a 18-year-old here to discuss depression.  Her parents are not with her but she gives me permission to call her mom and at the end of the visit I did that.  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  She is an excellent student and has current GPA of 3.8.  She has been struggling with depressive type symptoms and her mom is becoming concerned and wanted her to be evaluated.  Of note is the unfortunate fact that there was a suicide in the school district in the last month.  The patient acknowledges that this is probably part of the reason for her mom s concern but the patient has struggled with depression a good portion of her life.  She has never sought care with medications though she has used two other counselors and did not find that helpful.  The patient tells me she is unable to communicate and talk with people and that makes it hard for her in counseling.                 Her biggest concern is she feels very nervous, anxious, and on the edge most of the time.  She also gets very irritable.  Patient states she really has very little interest in doing anything.  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  She is a very good student and taking difficult courses such as chemistry and genetics.  She hopes to go to [employer] upon graduation and study biochemistry.                 The patient has a boyfriend of one year who is very supportive of her and actually encouraged her to seek medical care to get help with her depression.  She cannot tell me names of any friends that she has at school.  Patient states she was bullied in grade school in [place] before moving to [place] but denies any current bullying.                  FAMILY HISTORY: Mom suffers from depression although the patient is not sure if she has ever used any medication.  There also seems to be an uncle and grandmother who also have depression.                She has four siblings and she is the second oldest.  Her older brother goes to school at the [employer].  She seems to enjoy her sibling but when they are all together it gets very stressful.            3/19/2019   Marla has been on sertraline 150 mg for a few months and was doing well till January, feeling very anxious.          sleep is ok, diet is good, she started exercising and did start yoga.          She is active in Mock Trial and [employer], she will graduate this spring and travel to [facility] with school. She will go to [employer] next year after graduation          her PHQ 9 score is 16, NANDO 7 score is 13          she can't pinpoint the source of anxiety, may be upcoming graduation          would like to trial med adjustment [1]          10/25/2019          here in f/u          she is in freshman year in [place], studying bio chem and health management          she loves school          depression is well controlled but she stopped the bupropion because it made her MORE anxious          the sertraline does not help with anxiety but she is very happy how it helps with depression and would like to continue but add something for anxiety          she tries to keep busy otherwise she overthinks everything, this is the greatest source of anxiety, which bothers her daily          see PHQ 9 and NANDO 7      Physical Exam       Vitals & Measurements        T: 98.6   F (Tympanic)  HR: 76(Peripheral)  BP: 102/62         HT: 65.5 in  WT: 129.2 lb  BMI: 21.17           [id number]          good eye contact          speech not forced      Assessment/Plan           Mild major depression (F32.0)             will add buspar for anxiety and continue sertraline            encouraged  involvement in other volunteer or hobby activities            see me in 1 month for recheck meds            15 min with pt all in counseling           Orders:             busPIRone, See Instructions, Instructions: 1 tab(s) po tid for 1 week then increas to 2 tabs 3x per day, # 147 EA, 1 Refill(s), Type: Maintenance, Pharmacy: [facility] #[id number], 1 tab(s) po tid for 1 week then increas to 2 tabs 3x per day, (Ordered)      Patient Information         Name:JUAN SHAW          Address:          [address] [place] [id number]         Sex:Female         YOB: 2000         [phone]         Emergency Contact:MARCO SHAW         MRN:[id number]         FIN:[id number]         Location:[facility]         Date of Service:10/25/2019          Primary Care Physician:           MOIRA ,           Attending Physician:           Kelley Coker, [phone]      Problem List/Past Medical History        Ongoing         Acne vulgaris         Asthma         Mild major depression        Historical         H/O: chickenpox      Procedure/Surgical History         Insertion of IUD (06/14/2019)                    Comments: Consent form signed with NCB. Kyleena IUD placed.          Due for removal by 6/14/2024          Lot:[id number]          Exp: 02/2021.         Myringotomy and insertion of tympanic ventilation tube                Medications        albuterol 90mcg/inhalation MDI        busPIRone 5 mg oral tablet, See Instructions, 1 refills        Clindagel 1% topical gel, 1 ximena, Topical, hs, 5 refills        Kyleena 19.5 mg intrauterine device, 19.5 mg= 1 EA, Intrauteral, once        ondansetron 4 mg oral tablet, disintegrating, 4 mg= 1 tab(s), Oral, q8 hrs, 1 refills        sertraline 100 mg oral tablet, 1.5 tab(s), Oral, daily, 1 refills        SUMAtriptan 50 mg oral tablet, See Instructions, PRN, 11 refills      Allergies        No Known Medication  Allergies      Social History        Smoking Status - 10/25/2019         Never smoker         Alcohol          Current, 1-2 times per month, 4 drinks/episode average. 5 drinks/episode maximum., 09/11/2018         Home/Environment          Risks in environment: Gun in the home.., 12/31/2014         Tobacco          Never, 12/31/2014      Family History        Diabetes mellitus: Grandfather (M).        HTN - Hypertension: Grandfather (M) and Grandmother (M).        Seizure: Brother.      Immunizations          Vaccine Date Status Comments          Hep A, pediatric/adolescent 05/01/2019 Given          typhoid, inactivated 05/01/2019 Given          meningococcal conjugate vaccine 10/11/2018 Given          influenza virus vaccine, inactivated 10/11/2018 Given          Hep A, pediatric/adolescent 09/01/2015 Given          tetanus/diphth/pertuss (Tdap) adult/adol 09/01/2015 Given          human papillomavirus vaccine 09/01/2015 Given          Hep A, pediatric/adolescent 04/23/2015 Given          human papillomavirus vaccine 04/23/2015 Given          meningococcal conjugate vaccine 07/23/2013 Recorded          human papillomavirus vaccine 07/23/2013 Recorded          influenza virus vaccine, inactivated 11/28/2012 Recorded          tetanus/diphth/pertuss (Tdap) adult/adol 11/28/2012 Recorded          human papillomavirus vaccine 11/28/2012 Recorded          DTaP 08/21/2006 Recorded          MMR (measles/mumps/rubella) 08/21/2006 Recorded          IPV 08/21/2006 Recorded          DTaP 09/03/2003 Recorded          varicella 09/13/2002 Recorded          Hep B 09/13/2002 Recorded          Hep B-Hib 03/27/2002 Recorded          MMR (measles/mumps/rubella) 03/27/2002 Recorded          DTaP 2001 Recorded          pneumococcal (PCV7) 2001 Recorded          IPV 2001 Recorded          DTaP 2001 Recorded          Hib (PRP-T) 2001 Recorded          IPV 2001 Recorded          DTaP 2001  Recorded          Hep B-Hib 2001 Recorded          IPV 2001 Recorded          Hep B 2001 Recorded          pneumococcal (PCV7) - Not Given Not Necessary          pneumococcal (PCV7) - Not Given Not Necessary          pneumococcal (PCV7) - Not Given Not Necessary          Hib (HbOC) - Not Given Not Necessary  [1] depression; Don PITTMAN, Kelley 03/19/2019 14:50 CDT

## 2022-02-15 NOTE — TELEPHONE ENCOUNTER
---------------------  From: Aba Hanley MD   To: Appointment Pool ([id number] - [facility]);     Sent: 7/23/2020 10:44:23 AM CDT  Subject: General Message     Please set up Covid testing this afternoon.PT SCHEDULED TODAY 210

## 2022-02-15 NOTE — NURSING NOTE
Comprehensive Intake Entered On:  10/25/2019 12:48 PM CDT    Performed On:  10/25/2019 12:44 PM CDT by Payton Bhat CMA               Summary   Chief Complaint :   Depression/anxiety med check, stopped taking bupropion approx 1 month ago due to depression getting worse   Home WHEATLEY Payton - 10/25/2019 12:49 PM CDT     Menstrual Status :   Menarcheal   Weight Measured :   129.2 lb(Converted to: 129 lb 3 oz, 58.60 kg)    Height Measured :   65.5 in(Converted to: 5 ft 5 in, 166.37 cm)    Body Mass Index :   21.17 kg/m2   Body Surface Area :   1.64 m2   Systolic Blood Pressure :   102 mmHg   Diastolic Blood Pressure :   62 mmHg   Mean Arterial Pressure :   75 mmHg   Peripheral Pulse Rate :   76 bpm   BP Site :   Right arm   Pulse Site :   Radial artery   BP Method :   Manual   HR Method :   Manual   Temperature Tympanic :   98.6 DegF(Converted to: 37.0 DegC)    Payton Bhat CMA - 10/25/2019 12:44 PM CDT   Health Status   Allergies Verified? :   Yes   Medication History Verified? :   Yes   Medical History Verified? :   No   Pre-Visit Planning Status :   Completed   Tobacco Use? :   Never smoker   Payton Bhat CMA - 10/25/2019 12:44 PM CDT   Consents   Consent for Immunization Exchange :   Consent Granted   Consent for Immunizations to Providers :   Consent Granted   Payton Bhat CMA - 10/25/2019 12:44 PM CDT   Meds / Allergies   (As Of: 10/25/2019 12:48:21 PM CDT)   Allergies (Active)   No Known Medication Allergies  Estimated Onset Date:   Unspecified ; Created By:   Marla Espitia CMA; Reaction Status:   Active ; Category:   Drug ; Substance:   No Known Medication Allergies ; Type:   Allergy ; Updated By:   Marla Espitia CMA; Reviewed Date:   10/25/2019 12:44 PM CDT        Medication List   (As Of: 10/25/2019 12:48:21 PM CDT)   Prescription/Discharge Order    buPROPion 150 mg/12 hours (SR) oral tablet, extended release  :   buPROPion 150 mg/12 hours (SR) oral tablet, extended release ; Status:   Prescribed  ; Ordered As Mnemonic:   buPROPion 150 mg/12 hours (SR) oral tablet, extended release ; Simple Display Line:   See Instructions, TAKE ONE TABLET BY MOUTH EVERY MORNING, 90 tab(s), 1 Refill(s) ; Ordering Provider:   Kelley Coker; Catalog Code:   buPROPion ; Order Dt/Tm:   8/2/2019 1:14:41 PM CDT          buPROPion  :   buPROPion ; Status:   Prescribed ; Ordered As Mnemonic:   Wellbutrin  mg/12 hours oral tablet, extended release ; Simple Display Line:   150 mg, 1 tab(s), PO, daily, one tab each morning, 90 tab(s), 0 Refill(s) ; Ordering Provider:   Kelley Coker; Catalog Code:   buPROPion ; Order Dt/Tm:   7/1/2019 9:19:37 AM CDT          clindamycin topical  :   clindamycin topical ; Status:   Prescribed ; Ordered As Mnemonic:   Clindagel 1% topical gel ; Simple Display Line:   1 ximena, Topical, hs, 30 gm, 5 Refill(s) ; Ordering Provider:   Kelley Coker; Catalog Code:   clindamycin topical ; Order Dt/Tm:   8/2/2019 1:15:06 PM CDT          ondansetron  :   ondansetron ; Status:   Prescribed ; Ordered As Mnemonic:   ondansetron 4 mg oral tablet, disintegrating ; Simple Display Line:   4 mg, 1 tab(s), PO, q8 hrs, 10 tab(s), 1 Refill(s) ; Ordering Provider:   Kelley Coker; Catalog Code:   ondansetron ; Order Dt/Tm:   5/7/2018 7:00:49 PM CDT          sertraline  :   sertraline ; Status:   Prescribed ; Ordered As Mnemonic:   sertraline 100 mg oral tablet ; Simple Display Line:   1.5 tab(s), Oral, daily, 135 tab(s), 1 Refill(s) ; Ordering Provider:   Kelley Coker; Catalog Code:   sertraline ; Order Dt/Tm:   8/2/2019 1:14:16 PM CDT          SUMAtriptan  :   SUMAtriptan ; Status:   Prescribed ; Ordered As Mnemonic:   SUMAtriptan 50 mg oral tablet ; Simple Display Line:   See Instructions, 1/2 - 2 tab(s) PO Once  may repeat dose once in 2 hours, maximum of 200 mg per 24 hours, PRN: for migraine headache, 9 tab(s), 11 Refill(s) ; Ordering Provider:   Kelley Coker; Catalog Code:    SUMAtriptan ; Order Dt/Tm:   10/11/2018 8:05:52 PM CDT            Home Meds    albuterol  :   albuterol ; Status:   Documented ; Ordered As Mnemonic:   albuterol 90mcg/inhalation MDI ; Catalog Code:   albuterol ; Order Dt/Tm:   12/23/2014 6:36:59 PM CST          levonorgestrel  :   levonorgestrel ; Status:   Documented ; Ordered As Mnemonic:   Kyleena 19.5 mg intrauterine device ; Simple Display Line:   19.5 mg, 1 EA, Intrauteral, once, inserted by NCB on 6/14/19, due for removal by 6/14/24., 0 Refill(s) ; Catalog Code:   levonorgestrel ; Order Dt/Tm:   6/14/2019 2:08:42 PM CDT

## 2022-02-15 NOTE — NURSING NOTE
Comprehensive Intake Entered On:  3/19/2019 2:30 PM CDT    Performed On:  3/19/2019 2:24 PM CDT by Neha Berg CMA               Summary   Chief Complaint :   Depression medication check. Would like to discuss changing medication. Increased anxiety.    Menstrual Status :   Menarcheal   Weight Measured :   129 lb(Converted to: 129 lb 0 oz, 58.51 kg)    Height/Length Estimated :   65.5 in(Converted to: 5 ft 5 in, 166.37 cm)    Systolic Blood Pressure :   106 mmHg   Diastolic Blood Pressure :   64 mmHg   Mean Arterial Pressure :   78 mmHg   Peripheral Pulse Rate :   70 bpm   BP Site :   Right arm   BP Method :   Manual   Temperature Tympanic :   97.9 DegF(Converted to: 36.6 DegC)    Oxygen Saturation :   99 %   Neha Berg CMA - 3/19/2019 2:24 PM CDT   Health Status   Allergies Verified? :   Yes   Medication History Verified? :   Yes   Medical History Verified? :   Yes   Pre-Visit Planning Status :   Completed   Tobacco Use? :   Never smoker   Neha Berg CMA - 3/19/2019 2:24 PM CDT   Consents   Consent for Immunization Exchange :   Consent Granted   Consent for Immunizations to Providers :   Consent Granted   Neha Berg CMA - 3/19/2019 2:24 PM CDT   Meds / Allergies   (As Of: 3/19/2019 2:30:10 PM CDT)   Allergies (Active)   No Known Medication Allergies  Estimated Onset Date:   Unspecified ; Created By:   Marla Espitia CMA; Reaction Status:   Active ; Category:   Drug ; Substance:   No Known Medication Allergies ; Type:   Allergy ; Updated By:   Marla Espitia CMA; Reviewed Date:   3/19/2019 2:26 PM CDT        Medication List   (As Of: 3/19/2019 2:30:10 PM CDT)   Prescription/Discharge Order    drospirenone-ethinyl estradiol  :   drospirenone-ethinyl estradiol ; Status:   Prescribed ; Ordered As Mnemonic:   Karen 3 mg-0.03 mg oral tablet ; Simple Display Line:   1 tab(s), po, daily, 84 tab(s), 4 Refill(s) ; Ordering Provider:   Kelley Coker; Catalog Code:   drospirenone-ethinyl estradiol ; Order  Dt/Tm:   12/5/2018 9:52:01 AM          fluconazole  :   fluconazole ; Status:   Prescribed ; Ordered As Mnemonic:   fluconazole 150 mg oral tablet ; Simple Display Line:   150 mg, 1 tab(s), PO, Once, may repeat dose after 3 days if still symptomatic, 2 tab(s), 0 Refill(s) ; Ordering Provider:   Kelley Coker; Catalog Code:   fluconazole ; Order Dt/Tm:   10/15/2018 9:27:00 AM          nitrofurantoin  :   nitrofurantoin ; Status:   Completed ; Ordered As Mnemonic:   Macrobid 100 mg oral capsule ; Simple Display Line:   100 mg, 1 cap(s), PO, BID, for 7 day(s), 14 cap(s), 0 Refill(s) ; Ordering Provider:   Kelley Coker; Catalog Code:   nitrofurantoin ; Order Dt/Tm:   10/15/2018 9:25:39 AM          ondansetron  :   ondansetron ; Status:   Prescribed ; Ordered As Mnemonic:   ondansetron 4 mg oral tablet, disintegrating ; Simple Display Line:   4 mg, 1 tab(s), PO, q8 hrs, 10 tab(s), 1 Refill(s) ; Ordering Provider:   Kelley Coker; Catalog Code:   ondansetron ; Order Dt/Tm:   5/7/2018 7:00:49 PM          sertraline  :   sertraline ; Status:   Prescribed ; Ordered As Mnemonic:   sertraline 100 mg oral tablet ; Simple Display Line:   150 mg, 1.5 tab(s), PO, Daily, 135 tab(s), 1 Refill(s) ; Ordering Provider:   Kelley Coker; Catalog Code:   sertraline ; Order Dt/Tm:   10/11/2018 7:08:23 PM          sulfamethoxazole-trimethoprim  :   sulfamethoxazole-trimethoprim ; Status:   Completed ; Ordered As Mnemonic:   Bactrim  mg-160 mg oral tablet ; Simple Display Line:   1 tab(s), PO, BID, for 10 day(s), 20 tab(s), 0 Refill(s) ; Ordering Provider:   Kelley Coker; Catalog Code:   sulfamethoxazole-trimethoprim ; Order Dt/Tm:   10/11/2018 8:06:15 PM          SUMAtriptan  :   SUMAtriptan ; Status:   Prescribed ; Ordered As Mnemonic:   SUMAtriptan 50 mg oral tablet ; Simple Display Line:   See Instructions, 1/2 - 2 tab(s) PO Once  may repeat dose once in 2 hours, maximum of 200 mg per 24 hours, PRN: for  migraine headache, 9 tab(s), 11 Refill(s) ; Ordering Provider:   Kelley Coker; Catalog Code:   SUMAtriptan ; Order Dt/Tm:   10/11/2018 8:05:52 PM            Home Meds    albuterol  :   albuterol ; Status:   Documented ; Ordered As Mnemonic:   albuterol 90mcg/inhalation MDI ; Catalog Code:   albuterol ; Order Dt/Tm:   12/23/2014 6:36:59 PM          fluticasone-salmeterol  :   fluticasone-salmeterol ; Status:   Documented ; Ordered As Mnemonic:   Advair Diskus 250 mcg-50 mcg inhalation powder ; Simple Display Line:   2 puff(s), inh, bid, 0 Refill(s) ; Catalog Code:   fluticasone-salmeterol ; Order Dt/Tm:   1/8/2016 4:11:01 PM          fluticasone  :   fluticasone ; Status:   Documented ; Ordered As Mnemonic:   Flovent  mcg/inh inhalation aerosol ; Simple Display Line:   2 puff(s), inh, bid ; Catalog Code:   fluticasone ; Order Dt/Tm:   12/23/2014 6:37:39 PM

## 2022-02-15 NOTE — NURSING NOTE
Asthma Assessment Entered On:  12/24/2019 10:15 AM CST    Performed On:  12/24/2019 10:15 AM CST by Kelley Coker               History   Asthma Severity :   Intermittent   Kelley Coker - 12/24/2019 10:15 AM CST   Asthma Precipitating Factors Grid   Upper Respiratory Infection :   Yes   Kellye Coker - 12/24/2019 10:15 AM CST

## 2022-02-15 NOTE — PROGRESS NOTES
Patient:   JUAN SHAW            MRN: [id number]            FIN: [id number]               Age:   18 years     Sex:  Female     :  2000   Associated Diagnoses:   Mild major depression   Author:   Kelley Coker      Chief Complaint   2018 6:26 PM CDT    Patient presents for depression      History of Present Illness   concerning symptoms as listed in CC discussed and confirmed with pt  PHQ and CAGE scoring reviewed with pt.   The patient is a 18-year-old here to discuss depression.  Her parents are not with her but she gives me permission to call her mom and at the end of the visit I did that.  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  She is an excellent student and has current GPA of 3.8.  She has been struggling with depressive type symptoms and her mom is becoming concerned and wanted her to be evaluated.  Of note is the unfortunate fact that there was a suicide in the school district in the last month.  The patient acknowledges that this is probably part of the reason for her mom s concern but the patient has struggled with depression a good portion of her life.  She has never sought care with medications though she has used two other counselors and did not find that helpful.  The patient tells me she is unable to communicate and talk with people and that makes it hard for her in counseling.     Her biggest concern is she feels very nervous, anxious, and on the edge most of the time.  She also gets very irritable.  Patient states she really has very little interest in doing anything.  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  She is a very good student and taking difficult courses such as chemistry and genetics.  She hopes to go to [employer] upon graduation and study biochemistry.     The patient has a boyfriend of one year who is very supportive of her and actually encouraged her to seek  medical care to get help with her depression.  She cannot tell me names of any friends that she has at school.  Patient states she was bullied in grade school in [place] before moving to [place] but denies any current bullying.     FAMILY HISTORY: Mom suffers from depression although the patient is not sure if she has ever used any medication.  There also seems to be an uncle and grandmother who also have depression.    She has four siblings and she is the second oldest.  Her older brother goes to school at the [employer] in [place].  She seems to enjoy her sibling but when they are all together it gets very stressful.          Health Status   Allergies:    Allergic Reactions (Selected)  No Known Medication Allergies   Medications:  (Selected)   Prescriptions  Prescribed  SUMAtriptan 50 mg oral tablet: See Instructions, Instructions: 1/2 - 2 tab(s) PO Once  may repeat dose once in 2 hours, maximum of 200 mg per 24 hours, PRN: for migraine headache, # 9 tab(s), 11 Refill(s), Type: Soft Stop, Pharmacy: [id number], 1/2 - 2 tab(s) PO Onc...  sertraline 50 mg oral tablet: 1 tab(s) ( 50 mg ), PO, Daily, # 30 tab(s), 1 Refill(s), Type: Maintenance, Pharmacy: [id number], 1 tab(s) po daily  Documented Medications  Documented  Advair Diskus 250 mcg-50 mcg inhalation powder: 2 puff(s), inh, bid, 0 Refill(s), Type: Maintenance  Flovent  mcg/inh inhalation aerosol: 2 puff(s), inh, bid, 0 Refill(s), Type: Maintenance  Karen 3 mg-0.03 mg oral tablet: 1 tab(s), po, daily, 0 Refill(s), Type: Maintenance  albuterol 90mcg/inhalation MDI: 0 Refill(s), Type: Maintenance   Problem list:    All Problems  Asthma / SNOMED CT [id number] / Confirmed      Histories   Past Medical History:    No active or resolved past medical history items have been selected or recorded.   Family History:    Diabetes mellitus  Grandfather (M)  Seizure  Brother  HTN - Hypertension  Grandfather  (M)  Grandmother (M)     Procedure history:    No active procedure history items have been selected or recorded.   Social History:        Alcohol Assessment            Never      Tobacco Assessment            Never      Home and Environment Assessment            Risks in environment: Gun in the home..        Physical Examination   Last Menstrual Period: 4/17/2018   Vital Signs   4/19/2018 6:26 PM CDT Temperature Tympanic 98.2 DegF    Peripheral Pulse Rate 70 bpm    HR Method Electronic    Systolic Blood Pressure 90 mmHg    Diastolic Blood Pressure 52 mmHg    Mean Arterial Pressure 65 mmHg    BP Site Right arm    BP Method Manual    Oxygen Saturation 100 %      Measurements from flowsheet : Measurements   4/19/2018 6:26 PM CDT Height Measured - Standard 65.5 in    Weight Measured - Standard 128 lb    BSA 1.64 m2    Body Mass Index 20.97 kg/m2    Body Mass Index Percentile 49.96      General:  Alert and oriented, No acute distress, good eye contact, engaging with conversation, tearful at times.    Neck:  Supple, Non-tender, No lymphadenopathy, No thyromegaly.    Respiratory:  Lungs are clear to auscultation, Respirations are non-labored, Breath sounds are equal.    Cardiovascular:  Normal rate, Regular rhythm.    Psychiatric:  Cooperative, Appropriate mood & affect, Normal judgment, Non-suicidal.       Impression and Plan   Diagnosis     Mild major depression (QUN93-ZC F32.0).     Patient Instructions:  Lifestyle risk factors.         Limitations: Alcohol consumption.         Counseled: Patient, Regarding diagnosis, Regarding treatment, Regarding medications, Diet, Activity, Verbalized understanding, counseled for 25 min regarding the use/risk/benefits of antidepression/anxiety medication including the black box warning, counseled regarding the importance of psychotherapeutic  counseling (has/given resources), counseled regarding signs of worsening that would warrant immediate return to clinic, or ER or call to campus  security/suicide hot line.  Counseled regarding healthy sleep pattern and importance of diet and exercise.  Pt expresses understanding.  I also called her mother, Alayna, and discussed plan and she is agreeable to medication for Marla. Also given Ethel Allison's card, to contact her for counseling  See me in 3 weeks, sooner if any problems. Can call for refill if unable to get to clinic.    Orders     Orders (Selected)   Prescriptions  Prescribed  sertraline 50 mg oral tablet: 1 tab(s) ( 50 mg ), PO, Daily, # 30 tab(s), 1 Refill(s), Type: Maintenance, Pharmacy: [id number], 1 tab(s) po daily.

## 2022-02-15 NOTE — TELEPHONE ENCOUNTER
Entered by Nicolle Toure LPN on September 10, 2021 12:59:01 PM CDT  ---------------------  From: Nicolle Toure LPN   To: [facility] #[id number]    Sent: 9/10/2021 12:59:01 PM CDT  Subject: Medication Management     ** Submitted: **  Order:buPROPion (buPROPion 150 mg/24 hours (XL) oral tablet, extended release)  1 tab(s)  Oral  q 24 hrs  Qty:  30 tab(s)        Refills:  0          Substitutions Allowed     Route To [facility] #[id number]    Signed by Nicolle Toure LPN  9/10/2021 5:58:00 PM [facility]    ** Submitted: **  Complete:buPROPion (Wellbutrin  mg/24 hours oral tablet, extended release)   Signed by Nicolle Toure LPN  9/10/2021 5:59:00 PM [facility]    ** Not Approved:  **  buPROPion (BUPROPION XL 150MG TABLETS (24 H))  TAKE 1 TABLET BY MOUTH EVERY 24 HOURS  Qty:  90 tab(s)        Days Supply:  90        Refills:  0          Substitutions Allowed     [id number]   Signed by Nicolle Toure LPN          Pt has not followed up on depression since July 2020.  C placed.   30 day supply sent in      ------------------------------------------  From: [facility] #[id number]  To: Kelley Coker  Sent: September 9, 2021 3:49:10 AM CDT  Subject: Medication Management  Due: August 20, 2021 11:16:59 AM CDT     ** On Hold Pending Signature **     Dispensed Drug: buPROPion (buPROPion 150 mg/24 hours (XL) oral tablet, extended release), TAKE 1 TABLET BY MOUTH EVERY 24 HOURS  Quantity: 90 tab(s)  Days Supply: 90  Refills: 0  Substitutions Allowed  Notes from Pharmacy:  ------------------------------------------

## 2022-02-15 NOTE — TELEPHONE ENCOUNTER
---------------------  From: Otis WHEATLEYNeha ([facility] ([facility]))   To: Kelley Coker;     Sent: 6/21/2019 4:18:35 PM CDT  Subject: FW: Medication Management   Due Date/Time: 6/22/2019 4:10:00 PM CDT     Saw [facility] 3/19/19 for depression where medication was last filled. Is due for a f/u depression visit now per RTC. Please advise.        ------------------------------------------  From: [id number]  To: Kelley Coker  Sent: June 21, 2019 4:10:29 PM CDT  Subject: Medication Management  Due: June 22, 2019 4:10:29 PM CDT    ** On Hold Pending Signature **  Drug: buPROPion (Wellbutrin  mg/12 hours oral tablet, extended release)  1 TAB(S) ORAL DAILY,INSTR:ONE TAB EACH MORNING  Quantity: 90 tab(s)     Days Supply: 0         Refills: 0  Substitutions Allowed  Notes from Pharmacy:     Dispensed Drug: buPROPion (buPROPion 150 mg/12 hours (SR) oral tablet, extended release)  TAKE ONE TABLET BY MOUTH EVERY MORNING  Quantity: 90 tab(s)     Days Supply: 90        Refills: 0  Substitutions Allowed  Notes from Pharmacy:   ---------------------------------------------------------------  From: Kelley Coker   To: [facility] ([id number]_[place] [facility] [place]);     Sent: 6/21/2019 4:43:02 PM CDT  Subject: RE: Medication Management     please refill 90 daysRefilled for 90 days per [facility] note.  Patient notified.

## 2022-02-15 NOTE — PROGRESS NOTES
Chief Complaint    Patient is here for possible UTI. Received verbal consent from patient's mom Alayna for patient to be seen.  History of Present Illness       Patient presents to clinic today with pain with urination for a few days.      No fevers, chills, sore throat or runny nose.      She has urinary frequency, no hematuria, no hesitancy, no flank pain.      She has no new partners, no pelvic pain, no lesions.      ROS: neg except as HPI      Exam:      General: alert and oriented ×3 no acute distress.      HEENT: pupils are equal round and reactive to light extraocular motion is intact. Normocephalic and atraumatic.       Hearing is grossly normal and there is no otorrhea.       Nares are patent there is no rhinorrhea.       Mucous membranes are moist and pink.      Chest: has bilateral rise with no increased work of breathing.      Cardiovascular: normal perfusion and brisk capillary refill.      Musculoskeletal: no gross focal abnormalities and normal gait.      Neuro: no gross focal abnormalities and memory seems intact.      Psychiatric: speech is clear and coherent and fluent. Patient dressed appropriately for the weather. Mood is appropriate and affect is full.      Back: no CV tenderness      ABD: soft, no rebound or guarding, mild suprapubic tenderness,      Education:  patient should drink at least 8 8 oz glasses of water daily, wipe front to back,  and should return to clinic if symptoms worsen or do not improve.  Physical Exam   Vitals & Measurements    T: 98.2   F (Tympanic)  HR: 64(Peripheral)  BP: 92/60     WT: 124.6 lb   Assessment/Plan       Burning with urination (R30.0)         Orders:          [id number] office outpatient visit 25 minutes (Charge), Quantity: 1, Cystitis  Burning with urination          Culture, Urine, Routine* ([facility]), Specimen Type: Urine (Clean Catch), Collection Date: 08/21/18 16:07:00 CDT          POC URINALYSIS, UA* ([facility]), Specimen Type: Urine, Collection Date: 08/21/18  16:07:00 CDT                Cystitis (N30.00)         Orders:          nitrofurantoin, 1 cap(s) ( 100 mg ), PO, BID, # 14 cap(s), 0 Refill(s), Type: Maintenance, Pharmacy: [id number], 1 cap(s) Oral bid,x7 day(s), (Ordered)          [id number] office outpatient visit 25 minutes (Charge), Quantity: 1, Cystitis  Burning with urination           Patient Information     Name:JUAN SHAW      Address:      [address] [place] [id number]     Sex:Female     YOB: 2000     [phone]     Emergency Contact:MARCO SHAW     MRN:[id number]     FIN:[id number]     Location:[facility]     Date of Service:2018      Primary Care Physician:       NONE ,       Attending Physician:       Traci LIMON Katia, [phone]  Problem List/Past Medical History    Ongoing     Asthma     Mild major depression    Historical     No qualifying data  Medications     albuterol 90mcg/inhalation MDI: 0 Refill(s), Type: Maintenance.     Flovent  mcg/inh inhalation aerosol: 2 puff(s), inh, bid.     Advair Diskus 250 mcg-50 mcg inhalation powder: 2 puff(s), inh, bid, 0 Refill(s).     Karen 3 mg-0.03 mg oral tablet: 1 tab(s), po, daily, 0 Refill(s).     SUMAtriptan 50 mg oral tablet: See Instructions, 1/2 - 2 tab(s) PO Once  may repeat dose once in 2 hours, maximum of 200 mg per 24 hours, PRN: for migraine headache, 9 tab(s), 11 Refill(s).     ondansetron 4 mg oral tablet, disintegratin mg, 1 tab(s), PO, q8 hrs, 10 tab(s), 1 Refill(s).     sertraline 100 mg oral tablet: 100 mg, 1 tab(s), PO, Daily, 90 tab(s), 0 Refill(s).     Macrobid 100 mg oral capsule: 100 mg, 1 cap(s), PO, BID, for 7 day(s), 14 cap(s), 0 Refill(s).          Allergies    No Known Medication Allergies  Social History    Smoking Status - 2018     Never smoker     Alcohol      Current, 1-2 times per month, 2018     Home and Environment      Risks in environment: Gun in the  home.., 12/31/2014     Tobacco      Never, 12/31/2014  Family History    Diabetes mellitus: Grandfather (M).    HTN - Hypertension: Grandfather (M) and Grandmother (M).    Seizure: Brother.  Immunizations      Vaccine Date Status Comments      human papillomavirus vaccine 09/01/2015 Given      Hep A, pediatric/adolescent 09/01/2015 Given      tetanus/diphth/pertuss (Tdap) adult/adol 09/01/2015 Given      human papillomavirus vaccine 04/23/2015 Given      Hep A, pediatric/adolescent 04/23/2015 Given      meningococcal conjugate vaccine 07/23/2013 Recorded      human papillomavirus vaccine 07/23/2013 Recorded      human papillomavirus vaccine 11/28/2012 Recorded      tetanus/diphth/pertuss (Tdap) adult/adol 11/28/2012 Recorded      influenza virus vaccine, inactivated 11/28/2012 Recorded      IPV 08/21/2006 Recorded      MMR (measles/mumps/rubella) 08/21/2006 Recorded      DTaP 08/21/2006 Recorded      DTaP 09/03/2003 Recorded      Hep B 09/13/2002 Recorded      varicella 09/13/2002 Recorded      Hep B-Hib 03/27/2002 Recorded      MMR (measles/mumps/rubella) 03/27/2002 Recorded      IPV 2001 Recorded      pneumococcal (PCV7) 2001 Recorded      DTaP 2001 Recorded      Hib (PRP-T) 2001 Recorded      IPV 2001 Recorded      DTaP 2001 Recorded      IPV 2001 Recorded      Hep B-Hib 2001 Recorded      DTaP 2001 Recorded      Hep B 2001 Recorded      Hib (HbOC) - Not Given Not Necessary      pneumococcal (PCV7) - Not Given Not Necessary      pneumococcal (PCV7) - Not Given Not Necessary      pneumococcal (PCV7) - Not Given Not Necessary  Lab Results       Lab Results (Last 4 results within 90 days)        UA pH: 7 [5  - 8] (08/21/18 16:16:00 CDT)       UA Specific Gravity: 1.02 [1.001  - 1.035] (08/21/18 16:16:00 CDT)       UA Glucose: NEGATIVE [None  - Few] (08/21/18 16:16:00 CDT)       UA Bilirubin: NEGATIVE [None  - Few] (08/21/18 16:16:00 CDT)       UA Ketones:  NEGATIVE [None  - Few] (08/21/18 16:16:00 CDT)       Urine Occult Blood: NEGATIVE [None  - Few] (08/21/18 16:16:00 CDT)       UA Protein: NEGATIVE [None  - Few] (08/21/18 16:16:00 CDT)       UA Nitrite: POSITIVE Abnormal [None  - Few] (08/21/18 16:16:00 CDT)       UA Leukocyte Esterase: 1+ Abnormal [None  - Few] (08/21/18 16:16:00 CDT)       UA WBC Clumps: Present [None  - Few] (08/21/18 16:34:00 CDT)       UA Epithelial Cells: Few [None  - Few] (08/21/18 16:34:00 CDT)       UA WBC: 26-50 [None  - 5] (08/21/18 16:34:00 CDT)       UA RBC: 0-2 [None  - 2] (08/21/18 16:34:00 CDT)       UA Bacteria: Many [None  - Few] (08/21/18 16:34:00 CDT)

## 2022-02-15 NOTE — NURSING NOTE
Comprehensive Intake Entered On:  5/1/2019 6:58 PM CDT    Performed On:  5/1/2019 6:51 PM CDT by Thad Dominguez CMA               Summary   Chief Complaint :   Pt here for a Travel Px to the [facility] and [facility] from 7/6-7/17   Menstrual Status :   Menarcheal   Weight Measured :   127 lb(Converted to: 127 lb 0 oz, 57.61 kg)    Height Measured :   65.5 in(Converted to: 5 ft 5 in, 166.37 cm)    Body Mass Index :   20.81 kg/m2   Body Surface Area :   1.63 m2   Systolic Blood Pressure :   94 mmHg   Diastolic Blood Pressure :   68 mmHg   Mean Arterial Pressure :   77 mmHg   Peripheral Pulse Rate :   80 bpm   BP Site :   Right arm   Temperature Tympanic :   97.3 DegF(Converted to: 36.3 DegC)  (LOW)    Respiratory Rate :   16 br/min   Thad Dominguez CMA - 5/1/2019 6:51 PM CDT   Health Status   Allergies Verified? :   Yes   Medication History Verified? :   Yes   Medical History Verified? :   Yes   Pre-Visit Planning Status :   Not completed   Tobacco Use? :   Never smoker   Thad Dominguez CMA - 5/1/2019 6:51 PM CDT   Meds / Allergies   (As Of: 5/1/2019 6:58:10 PM CDT)   Allergies (Active)   No Known Medication Allergies  Estimated Onset Date:   Unspecified ; Created By:   Marla Espitia CMA; Reaction Status:   Active ; Category:   Drug ; Substance:   No Known Medication Allergies ; Type:   Allergy ; Updated By:   Marla Espitia CMA; Reviewed Date:   5/1/2019 6:55 PM CDT        Medication List   (As Of: 5/1/2019 6:58:10 PM CDT)   Prescription/Discharge Order    sertraline  :   sertraline ; Status:   Prescribed ; Ordered As Mnemonic:   sertraline 100 mg oral tablet ; Simple Display Line:   150 mg, 1.5 tab(s), PO, Daily, 135 tab(s), 0 Refill(s) ; Ordering Provider:   Kelley Coker; Catalog Code:   sertraline ; Order Dt/Tm:   3/19/2019 2:47:40 PM          buPROPion  :   buPROPion ; Status:   Prescribed ; Ordered As Mnemonic:   Wellbutrin  mg/12 hours oral tablet, extended release ; Simple Display Line:   150 mg, 1  tab(s), PO, daily, one tab each morning, 90 tab(s), 0 Refill(s) ; Ordering Provider:   Kelley Coker; Catalog Code:   buPROPion ; Order Dt/Tm:   3/19/2019 2:46:30 PM          drospirenone-ethinyl estradiol  :   drospirenone-ethinyl estradiol ; Status:   Prescribed ; Ordered As Mnemonic:   Karen 3 mg-0.03 mg oral tablet ; Simple Display Line:   1 tab(s), po, daily, 84 tab(s), 4 Refill(s) ; Ordering Provider:   Kelley Coker; Catalog Code:   drospirenone-ethinyl estradiol ; Order Dt/Tm:   12/5/2018 9:52:01 AM          fluconazole  :   fluconazole ; Status:   Prescribed ; Ordered As Mnemonic:   fluconazole 150 mg oral tablet ; Simple Display Line:   150 mg, 1 tab(s), PO, Once, may repeat dose after 3 days if still symptomatic, 2 tab(s), 0 Refill(s) ; Ordering Provider:   Kelley Coker; Catalog Code:   fluconazole ; Order Dt/Tm:   10/15/2018 9:27:00 AM          SUMAtriptan  :   SUMAtriptan ; Status:   Prescribed ; Ordered As Mnemonic:   SUMAtriptan 50 mg oral tablet ; Simple Display Line:   See Instructions, 1/2 - 2 tab(s) PO Once  may repeat dose once in 2 hours, maximum of 200 mg per 24 hours, PRN: for migraine headache, 9 tab(s), 11 Refill(s) ; Ordering Provider:   Kelley Coker; Catalog Code:   SUMAtriptan ; Order Dt/Tm:   10/11/2018 8:05:52 PM          ondansetron  :   ondansetron ; Status:   Prescribed ; Ordered As Mnemonic:   ondansetron 4 mg oral tablet, disintegrating ; Simple Display Line:   4 mg, 1 tab(s), PO, q8 hrs, 10 tab(s), 1 Refill(s) ; Ordering Provider:   Kelley Coker; Catalog Code:   ondansetron ; Order Dt/Tm:   5/7/2018 7:00:49 PM            Home Meds    fluticasone-salmeterol  :   fluticasone-salmeterol ; Status:   Documented ; Ordered As Mnemonic:   Advair Diskus 250 mcg-50 mcg inhalation powder ; Simple Display Line:   2 puff(s), inh, bid, 0 Refill(s) ; Catalog Code:   fluticasone-salmeterol ; Order Dt/Tm:   1/8/2016 4:11:01 PM          albuterol  :   albuterol ; Status:    Documented ; Ordered As Mnemonic:   albuterol 90mcg/inhalation MDI ; Catalog Code:   albuterol ; Order Dt/Tm:   12/23/2014 6:36:59 PM          fluticasone  :   fluticasone ; Status:   Documented ; Ordered As Mnemonic:   Flovent  mcg/inh inhalation aerosol ; Simple Display Line:   2 puff(s), inh, bid ; Catalog Code:   fluticasone ; Order Dt/Tm:   12/23/2014 6:37:39 PM            Procedures / Surgeries        -    Procedure History   (As Of: 5/1/2019 6:58:10 PM CDT)     Anesthesia Minutes:   0 ; Procedure Name:   Myringotomy and insertion of tympanic ventilation tube ; Procedure Minutes:   0 ; Last Reviewed Dt/Tm:   5/1/2019 6:57:02 PM CDT            Family History   Family History   (As Of: 5/1/2019 6:58:10 PM CDT)   Brother:   Relation:   Brother ; Gender:   Male ;           Nomenclature:   Seizure ; Value:   Positive          Grandmother (M):   Relation:   Grandmother (M) ;           Nomenclature:   HTN - Hypertension ; Value:   Positive          Grandfather (M):   Relation:   Grandfather (M) ;           Nomenclature:   Diabetes mellitus ; Value:   Positive            Nomenclature:   HTN - Hypertension ; Value:   Positive            Social History   Social History   (As Of: 5/1/2019 6:58:10 PM CDT)   Alcohol:        Current, 1-2 times per month, 4 drinks/episode average.  5 drinks/episode maximum.   (Last Updated: 9/11/2018 9:09:01 AM CDT by Annetta Mi)          Tobacco:        Never   (Last Updated: 12/31/2014 10:58:15 AM CST by Penny Sanchez MA)          Home and Environment:        Risks in environment: Gun in the home..   (Last Updated: 12/31/2014 1:53:04 PM CST by Pita Romero)

## 2022-02-15 NOTE — NURSING NOTE
Comprehensive Intake Entered On:  1/31/2020 2:52 PM CST    Performed On:  1/31/2020 2:48 PM CST by Neha Berg CMA               Summary   Chief Complaint :   Patient presents with plugged right ear and sore throat x4 days.    Menstrual Status :   Menarcheal   Weight Measured :   126 lb(Converted to: 126 lb 0 oz, 57.15 kg)    Systolic Blood Pressure :   93 mmHg   Diastolic Blood Pressure :   60 mmHg   Mean Arterial Pressure :   71 mmHg   Peripheral Pulse Rate :   90 bpm   BP Site :   Right arm   BP Method :   Electronic   Temperature Tympanic :   98.4 DegF(Converted to: 36.9 DegC)    Oxygen Saturation :   99 %   Neha Berg CMA - 1/31/2020 2:48 PM CST   Health Status   Allergies Verified? :   Yes   Medication History Verified? :   Yes   Medical History Verified? :   Yes   Pre-Visit Planning Status :   Completed   Tobacco Use? :   Never smoker   Neha Berg CMA - 1/31/2020 2:48 PM CST   Consents   Consent for Immunization Exchange :   Consent Granted   Consent for Immunizations to Providers :   Consent Granted   Neha Berg CMA - 1/31/2020 2:48 PM CST   Meds / Allergies   (As Of: 1/31/2020 2:52:24 PM CST)   Allergies (Active)   No Known Medication Allergies  Estimated Onset Date:   Unspecified ; Created By:   Marla Espitia CMA; Reaction Status:   Active ; Category:   Drug ; Substance:   No Known Medication Allergies ; Type:   Allergy ; Updated By:   Marla Espitia CMA; Reviewed Date:   1/31/2020 2:50 PM CST        Medication List   (As Of: 1/31/2020 2:52:24 PM CST)   Prescription/Discharge Order    albuterol  :   albuterol ; Status:   Prescribed ; Ordered As Mnemonic:   Proventil HFA 90 mcg/inh inhalation aerosol ; Simple Display Line:   2 puff(s), Inhale, q4-6 hrs, do not fill till she calls, 1 EA, 1 Refill(s) ; Ordering Provider:   Kelley Coker; Catalog Code:   albuterol ; Order Dt/Tm:   12/24/2019 10:04:21 AM CST          buPROPion  :   buPROPion ; Status:   Prescribed ; Ordered As Mnemonic:    Wellbutrin  mg/24 hours oral tablet, extended release ; Simple Display Line:   150 mg, 1 tab(s), Oral, q 24 hrs, 90 EA, 1 Refill(s) ; Ordering Provider:   Kelley Coker; Catalog Code:   buPROPion ; Order Dt/Tm:   12/24/2019 10:02:00 AM CST          clindamycin topical  :   clindamycin topical ; Status:   Prescribed ; Ordered As Mnemonic:   Clindagel 1% topical gel ; Simple Display Line:   1 ximena, Topical, hs, 30 gm, 5 Refill(s) ; Ordering Provider:   Kelley Coker; Catalog Code:   clindamycin topical ; Order Dt/Tm:   8/2/2019 1:15:06 PM CDT          ondansetron  :   ondansetron ; Status:   Prescribed ; Ordered As Mnemonic:   ondansetron 4 mg oral tablet, disintegrating ; Simple Display Line:   4 mg, 1 tab(s), PO, q8 hrs, 10 tab(s), 1 Refill(s) ; Ordering Provider:   Kelley Coker; Catalog Code:   ondansetron ; Order Dt/Tm:   5/7/2018 7:00:49 PM CDT          sertraline  :   sertraline ; Status:   Prescribed ; Ordered As Mnemonic:   sertraline 100 mg oral tablet ; Simple Display Line:   1.5 tab(s), Oral, daily, 135 tab(s), 3 Refill(s) ; Ordering Provider:   Kelley Coker; Catalog Code:   sertraline ; Order Dt/Tm:   12/24/2019 10:03:38 AM CST          SUMAtriptan  :   SUMAtriptan ; Status:   Prescribed ; Ordered As Mnemonic:   SUMAtriptan 50 mg oral tablet ; Simple Display Line:   See Instructions, 1/2 - 2 tab(s) PO Once  may repeat dose once in 2 hours, maximum of 200 mg per 24 hours, PRN: for migraine headache, 9 tab(s), 11 Refill(s) ; Ordering Provider:   Kelley Coker; Catalog Code:   SUMAtriptan ; Order Dt/Tm:   10/11/2018 8:05:52 PM CDT            Home Meds    levonorgestrel  :   levonorgestrel ; Status:   Documented ; Ordered As Mnemonic:   Kyleena 19.5 mg intrauterine device ; Simple Display Line:   19.5 mg, 1 EA, Intrauteral, once, inserted by [facility] on 6/14/19, due for removal by 6/14/24., 0 Refill(s) ; Catalog Code:   levonorgestrel ; Order Dt/Tm:   6/14/2019 2:08:42 PM CDT

## 2022-02-15 NOTE — NURSING NOTE
Comprehensive Intake Entered On:  6/14/2019 1:38 PM CDT    Performed On:  6/14/2019 1:32 PM CDT by Christine Baez               Summary   Chief Complaint :   Pt here for IUD consult/insertion   Last Menstrual Period :   6/4/2019 CDT   Menstrual Status :   Menarcheal   Weight Measured :   125.0 lb(Converted to: 125 lb 0 oz, 56.70 kg)    Height Measured :   65.5 in(Converted to: 5 ft 5 in, 166.37 cm)    Body Mass Index :   20.48 kg/m2   Body Surface Area :   1.62 m2   Systolic Blood Pressure :   92 mmHg   Diastolic Blood Pressure :   60 mmHg   Mean Arterial Pressure :   71 mmHg   Peripheral Pulse Rate :   88 bpm   BP Site :   Right arm   Pulse Site :   Radial artery   BP Method :   Manual   HR Method :   Manual   Temperature Tympanic :   98.2 DegF(Converted to: 36.8 DegC)    Christine Baez - 6/14/2019 1:32 PM CDT   Health Status   Allergies Verified? :   Yes   Medication History Verified? :   Yes   Medical History Verified? :   Yes   Pre-Visit Planning Status :   Completed   Tobacco Use? :   Never smoker   Christine Baez - 6/14/2019 1:32 PM CDT   Consents   Consent for Immunization Exchange :   Consent Granted   Consent for Immunizations to Providers :   Consent Granted   Christine Baez - 6/14/2019 1:32 PM CDT   Meds / Allergies   (As Of: 6/14/2019 1:38:18 PM CDT)   Allergies (Active)   No Known Medication Allergies  Estimated Onset Date:   Unspecified ; Created By:   Marla Espitia CMA; Reaction Status:   Active ; Category:   Drug ; Substance:   No Known Medication Allergies ; Type:   Allergy ; Updated By:   Marla Espitia CMA; Reviewed Date:   6/14/2019 1:37 PM CDT        Medication List   (As Of: 6/14/2019 1:38:18 PM CDT)   Prescription/Discharge Order    sertraline  :   sertraline ; Status:   Prescribed ; Ordered As Mnemonic:   sertraline 100 mg oral tablet ; Simple Display Line:   1.5 tab(s), Oral, daily, 135 tab(s), 0 Refill(s) ; Ordering Provider:   Kelley Coker; Catalog Code:   sertraline ;  Order Dt/Tm:   5/10/2019 2:37:15 PM          azithromycin  :   azithromycin ; Status:   Processing ; Ordered As Mnemonic:   azithromycin 500 mg oral tablet ; Ordering Provider:   Brando Mohr PA-C; Action Display:   Complete ; Catalog Code:   azithromycin ; Order Dt/Tm:   6/14/2019 1:38:10 PM          buPROPion  :   buPROPion ; Status:   Prescribed ; Ordered As Mnemonic:   Wellbutrin  mg/12 hours oral tablet, extended release ; Simple Display Line:   150 mg, 1 tab(s), PO, daily, one tab each morning, 90 tab(s), 0 Refill(s) ; Ordering Provider:   Kelley Coker; Catalog Code:   buPROPion ; Order Dt/Tm:   3/19/2019 2:46:30 PM          drospirenone-ethinyl estradiol  :   drospirenone-ethinyl estradiol ; Status:   Processing ; Ordered As Mnemonic:   Karen 3 mg-0.03 mg oral tablet ; Ordering Provider:   Kelley Coker; Action Display:   Complete ; Catalog Code:   drospirenone-ethinyl estradiol ; Order Dt/Tm:   6/14/2019 1:38:10 PM          fluconazole  :   fluconazole ; Status:   Processing ; Ordered As Mnemonic:   fluconazole 150 mg oral tablet ; Ordering Provider:   Kelley Coker; Action Display:   Complete ; Catalog Code:   fluconazole ; Order Dt/Tm:   6/14/2019 1:38:10 PM          SUMAtriptan  :   SUMAtriptan ; Status:   Prescribed ; Ordered As Mnemonic:   SUMAtriptan 50 mg oral tablet ; Simple Display Line:   See Instructions, 1/2 - 2 tab(s) PO Once  may repeat dose once in 2 hours, maximum of 200 mg per 24 hours, PRN: for migraine headache, 9 tab(s), 11 Refill(s) ; Ordering Provider:   Kelley Coker; Catalog Code:   SUMAtriptan ; Order Dt/Tm:   10/11/2018 8:05:52 PM          ondansetron  :   ondansetron ; Status:   Prescribed ; Ordered As Mnemonic:   ondansetron 4 mg oral tablet, disintegrating ; Simple Display Line:   4 mg, 1 tab(s), PO, q8 hrs, 10 tab(s), 1 Refill(s) ; Ordering Provider:   Kelley Coker; Catalog Code:   ondansetron ; Order Dt/Tm:   5/7/2018 7:00:49 PM             Home Meds    fluticasone-salmeterol  :   fluticasone-salmeterol ; Status:   Processing ; Ordered As Mnemonic:   Advair Diskus 250 mcg-50 mcg inhalation powder ; Action Display:   Complete ; Catalog Code:   fluticasone-salmeterol ; Order Dt/Tm:   6/14/2019 1:38:10 PM          albuterol  :   albuterol ; Status:   Documented ; Ordered As Mnemonic:   albuterol 90mcg/inhalation MDI ; Catalog Code:   albuterol ; Order Dt/Tm:   12/23/2014 6:36:59 PM          fluticasone  :   fluticasone ; Status:   Processing ; Ordered As Mnemonic:   Flovent  mcg/inh inhalation aerosol ; Action Display:   Complete ; Catalog Code:   fluticasone ; Order Dt/Tm:   6/14/2019 1:38:10 PM

## 2022-02-15 NOTE — LETTER
"(Inserted Image. Unable to display)   January 10, 2020      JUAN SHAW      [address], [place] [id number]        Dear JUAN,    Thank you for selecting [facility] for your healthcare needs.  Below you will find the results of the recent tests done at our clinic.      The culture of your throat is NEGATIVE for group A Streptococcus, the type of Streptococcus that causes \"Strep Throat\".  Please call or return to see me if you are not feeling better.  Thank you.      Result Name Current Result   Culture Strep A  See comment 1/8/2020       Please contact me or my assistant at [phone] if you have any questions or concerns.     Sincerely,        MIGUELITO Johns-NP  Family Nurse Practitioner      What do your labs mean?  Below is a glossary of commonly ordered labs:  LDL   Bad Cholesterol   HDL   Good Cholesterol  AST/ALT   Liver Function   Cr/Creatinine   Kidney Function  Microalbumin   Kidney Function  BUN   Kidney Function  PSA   Prostate    TSH   Thyroid Hormone  HgbA1c   Diabetes Test   Hgb (Hemoglobin)   Red Blood Cells  WBC   White Blood Cell Count  "

## 2022-02-15 NOTE — TELEPHONE ENCOUNTER
Entered by Nicolle Toure LPN on December 01, 2021 11:15:17 AM CST  ---------------------  From: Nicolle Toure LPN   To: [facility] #[id number]    Sent: 12/1/2021 11:15:17 AM CST  Subject: Medication Management     ** Not Approved: Refill not appropriate, Rx sent 10/28/21 #90 with 1 refill **  buPROPion (BUPROPION XL 150MG TABLETS (24 H))  TAKE 1 TABLET BY MOUTH EVERY 24 HOURS  Qty:  30 tab(s)        Days Supply:  30        Refills:  0          Substitutions Allowed     Route To Pharmacy - [id number]   Signed by Nicolle Toure LPN            ------------------------------------------  From: [facility] #[id number]  To: Kelley Coker  Sent: November 28, 2021 3:48:27 AM CST  Subject: Medication Management  Due: November 16, 2021 3:37:48 PM CST     ** On Hold Pending Signature **     Dispensed Drug: buPROPion (buPROPion 150 mg/24 hours (XL) oral tablet, extended release), TAKE 1 TABLET BY MOUTH EVERY 24 HOURS  Quantity: 30 tab(s)  Days Supply: 30  Refills: 0  Substitutions Allowed  Notes from Pharmacy:  ------------------------------------------

## 2022-02-15 NOTE — NURSING NOTE
Pt seen at [facility] today for COVID testing per Kelley MELARA   .  Pt O2  98 %.  Specimen sent to [facility] lab.  Pt resides in [facility].  If Positive pt PUI form will be faxed to Public [facility].  Thad Dominugez CMA

## 2022-02-15 NOTE — NURSING NOTE
Comprehensive Intake Entered On:  4/5/2020 12:48 PM CDT    Performed On:  4/5/2020 12:44 PM CDT by Payton Bhat CMA               Summary   Chief Complaint :   Follow up UTI from 3/25/20, not getting better still having pain with urination, odor to urine, urinary frequency/urgency   Menstrual Status :   Menarcheal   Payton Bhat CMA - 4/5/2020 12:44 PM CDT   Health Status   Allergies Verified? :   Yes   Medication History Verified? :   Yes   Medical History Verified? :   No   Pre-Visit Planning Status :   Not completed   Tobacco Use? :   Never smoker   Payton Bhat CMA - 4/5/2020 12:44 PM CDT   Meds / Allergies   (As Of: 4/5/2020 12:48:38 PM CDT)   Allergies (Active)   No Known Medication Allergies  Estimated Onset Date:   Unspecified ; Created By:   Marla Espitia CMA; Reaction Status:   Active ; Category:   Drug ; Substance:   No Known Medication Allergies ; Type:   Allergy ; Updated By:   Marla Espitia CMA; Reviewed Date:   4/5/2020 12:47 PM CDT        Medication List   (As Of: 4/5/2020 12:48:39 PM CDT)   Prescription/Discharge Order    albuterol  :   albuterol ; Status:   Prescribed ; Ordered As Mnemonic:   Proventil HFA 90 mcg/inh inhalation aerosol ; Simple Display Line:   2 puff(s), Inhale, q4-6 hrs, do not fill till she calls, 1 EA, 1 Refill(s) ; Ordering Provider:   Kelley Coker; Catalog Code:   albuterol ; Order Dt/Tm:   12/24/2019 10:04:21 AM CST          buPROPion  :   buPROPion ; Status:   Prescribed ; Ordered As Mnemonic:   Wellbutrin  mg/24 hours oral tablet, extended release ; Simple Display Line:   150 mg, 1 tab(s), Oral, q 24 hrs, 90 EA, 1 Refill(s) ; Ordering Provider:   Kelley Coker; Catalog Code:   buPROPion ; Order Dt/Tm:   12/24/2019 10:02:00 AM CST          clindamycin topical  :   clindamycin topical ; Status:   Prescribed ; Ordered As Mnemonic:   Clindagel 1% topical gel ; Simple Display Line:   1 ximena, Topical, hs, 30 gm, 5 Refill(s) ; Ordering Provider:   Don  Kelley PITTMAN; Catalog Code:   clindamycin topical ; Order Dt/Tm:   8/2/2019 1:15:06 PM CDT          fluticasone nasal  :   fluticasone nasal ; Status:   Prescribed ; Ordered As Mnemonic:   Flonase 50 mcg/inh nasal spray ; Simple Display Line:   2 spray(s), Nasal, daily, 1 EA, 6 Refill(s) ; Ordering Provider:   Frandy Hernandez MD; Catalog Code:   fluticasone nasal ; Order Dt/Tm:   1/31/2020 2:56:26 PM CST          ondansetron  :   ondansetron ; Status:   Prescribed ; Ordered As Mnemonic:   ondansetron 4 mg oral tablet, disintegrating ; Simple Display Line:   4 mg, 1 tab(s), PO, q8 hrs, 10 tab(s), 1 Refill(s) ; Ordering Provider:   Kelley Coker; Catalog Code:   ondansetron ; Order Dt/Tm:   5/7/2018 7:00:49 PM CDT          sertraline  :   sertraline ; Status:   Prescribed ; Ordered As Mnemonic:   sertraline 100 mg oral tablet ; Simple Display Line:   1.5 tab(s), Oral, daily, 135 tab(s), 3 Refill(s) ; Ordering Provider:   Kelley Coker; Catalog Code:   sertraline ; Order Dt/Tm:   12/24/2019 10:03:38 AM CST          SUMAtriptan  :   SUMAtriptan ; Status:   Prescribed ; Ordered As Mnemonic:   SUMAtriptan 50 mg oral tablet ; Simple Display Line:   See Instructions, 1/2 - 2 tab(s) PO Once  may repeat dose once in 2 hours, maximum of 200 mg per 24 hours, PRN: for migraine headache, 9 tab(s), 11 Refill(s) ; Ordering Provider:   Kelley Coker; Catalog Code:   SUMAtriptan ; Order Dt/Tm:   10/11/2018 8:05:52 PM CDT            Home Meds    levonorgestrel  :   levonorgestrel ; Status:   Documented ; Ordered As Mnemonic:   Kyleena 19.5 mg intrauterine device ; Simple Display Line:   19.5 mg, 1 EA, Intrauteral, once, inserted by [facility] on 6/14/19, due for removal by 6/14/24., 0 Refill(s) ; Catalog Code:   levonorgestrel ; Order Dt/Tm:   6/14/2019 2:08:42 PM CDT

## 2022-02-15 NOTE — PROGRESS NOTES
Patient:   JUAN SHAW            MRN: [id number]            FIN: [id number]               Age:   19 years     Sex:  Female     :  2000   Associated Diagnoses:   Encounter for IUD insertion; Screen for STD (sexually transmitted disease)   Author:   Kelley Coker      Results Review   General results   Today's results   2019 1:32 PM CDT Height Measured - Standard 65.5 in    Weight Measured - Standard 125.0 lb    BSA 1.62 m2    Body Mass Index 20.48 kg/m2    Body Mass Index Percentile 37.98    Temperature Tympanic 98.2 DegF    Peripheral Pulse Rate 88 bpm    Pulse Site Radial artery    HR Method Manual    Systolic Blood Pressure 92 mmHg    Diastolic Blood Pressure 60 mmHg    Mean Arterial Pressure 71 mmHg    BP Site Right arm    BP Method Manual    Allergies Verified? Yes    Medication History Verified? Yes    Medical History Verified? Yes    Tobacco Use? Never smoker    Pre-Visit Planning Status Completed    Menstrual Status Menarcheal    Last Menstrual Period 2019    Chief Complaint Pt here for IUD consult/insertion    Consent for Immunization Exchange Consent Granted    Consent for Immunizations to Providers Consent Granted    Comprehensive Intake Form Comprehensive Intake Form    Intake Form Comprehensive Intake   2019 1:25 PM CDT U HCG POC NEGATIVE    Lab Report [facility] # [id number]         Health Status   Allergies:    Allergic Reactions (Selected)  No Known Medication Allergies   Medications:  (Selected)   Prescriptions  Prescribed  SUMAtriptan 50 mg oral tablet: See Instructions, Instructions: 1/2 - 2 tab(s) PO Once  may repeat dose once in 2 hours, maximum of 200 mg per 24 hours, PRN: for migraine headache, # 9 tab(s), 11 Refill(s), Type: Soft Stop, Pharmacy: [id number], 1/2 - 2 tab(s) PO Onc...  Wellbutrin  mg/12 hours oral tablet, extended release: = 1 tab(s) ( 150 mg ), PO, daily, Instructions: one tab each morning, # 90 tab(s), 0 Refill(s), Type:  Maintenance, [id number], 1 tab(s) Oral daily,Instr:one tab each morning  ondansetron 4 mg oral tablet, disintegratin tab(s) ( 4 mg ), PO, q8 hrs, # 10 tab(s), 1 Refill(s), Type: Maintenance, Pharmacy: [id number], 1 tab(s) po q8 hrs  sertraline 100 mg oral tablet: = 1.5 tab(s), Oral, daily, # 135 tab(s), 0 Refill(s), Type: Maintenance, [id number], due for appointment   Documented Medications  Documented  albuterol 90mcg/inhalation MDI: 0 Refill(s), Type: Maintenance   Problem list:    All Problems  Asthma / SNOMED CT 331602978 / Confirmed  Mild major depression / SNOMED CT 138887062 / Confirmed  Resolved: H/O: chickenpox / SNOMED CT 139886391      Subjective   She is here to receive information on using an Intrauterine device.  She denies the following conditions:   or miscarriage in the past 2 months, abnormalities of the uterus/pelvic organs,  current breast feeing, unexplained bleeding between periods, hepatitis of the liver, history of blood clots/clotting disorder/stroke or MI, history of other types of cancer, known or suspected pregnancy, allergy to progestin  fainting attacks, genital sores, history of ectopic pregnancy, current infection of the uterus or cervix.  Current form of birth control: she has been on oc's for 3 years and keeps running out of the pill she wants. She is wanting a more reliable method.  Same partner, Last IC about 10 days ago, prior to LMP which started about , did not restart oc 5 days ago when due to restart.   Last Pap due at age 22 years  Last STI screen will do today      Objective   Last Menstrual Period: 2019     Vital Signs   2019 1:32 PM CDT Temperature Tympanic 98.2 DegF    Peripheral Pulse Rate 88 bpm    Pulse Site Radial artery    HR Method Manual    Systolic Blood Pressure 92 mmHg    Diastolic Blood Pressure 60 mmHg    Mean Arterial Pressure 71 mmHg    BP Site Right arm    BP  Method Manual      Measurements from flowsheet : Measurements   6/14/2019 1:32 PM CDT Height Measured - Standard 65.5 in    Weight Measured - Standard 125.0 lb    BSA 1.62 m2    Body Mass Index 20.48 kg/m2    Body Mass Index Percentile 37.98         Procedure   Pap smear procedure   Date/ Time:  6/14/2019 2:10:00 PM.     Confirmed: patient, procedure, safety procedures followed.     Performed by: Kelley Coker.     Informed consent: signed by patient.     Indication: Would like To proceed with IUD insertion.  Reviewed with pt today the risks and benefits of procedure. Risks include infection, uterine perforation, expulsion , embedment in the myometrium and discomfort, .  Common side effects include vaginal bleeding alterations, amenorrhea, intermenstrual bleeding and spotting, abdominal/pelvic discomfort.      Pregnancy is ruled out: neg UPT today    With the patient in the lithotomy position, her external genitalia is normal. A bimanual exam is performed to locate the position and size of the uterus and there are no pelvic contraindications.   A sterile speculum is used to enter the vaginal canal.  The os is visualized. Three sterile Betadine soaked gauze are used to clean the os thoroughly.  The tenaculum is slowly applied near the lip of the cervix  and gentle traction is applied to align the cervical canal with the uterine cavity.  A sterile uterine sound is inserted to check the patency of the cervix, measure the depth of the uterine cavity, confirm its direction and exclude the presence of any uterine anomaly.  The uterus sounded to ______________6.5 cm_.     The Kyleena  IUD package was open,and the device is loaded into the insertion tube.   The tenaculum is grasped with one hand and with the other hand the insertion tube is gently advance through the cervical canal according to insertion guidelines.   The insertion tube was removed and threads were cut perpendicular with a sterile scissors, leaving  about 3 cm visible outside the cervix.   Instruments removed.    The patient tolerated the procedure well and had no acute pain. She sat up and was monitored and given the the patient Follow-up Reminder Card  and was asked to make an appointment in 4-12 weeks to be re evaluated. Reminded regarding bleeding pattern expectations, need for condoms if not in a monogamous relationship and the need for yearly  exams.  .     Procedure tolerated: well.     Complications: none.        Plan   Impression and Plan:  Orders   .    Diagnosis     Encounter for IUD insertion (MDR87-SV Z30.430).     Screen for STD (sexually transmitted disease) (EIR24-RM Z11.3).     .       Impression and Plan   Diagnosis     Encounter for IUD insertion (PMQ09-PC Z30.430).

## 2022-02-15 NOTE — LETTER
(Inserted Image. Unable to display)   February 05, 2020        JUAN SHAW  [address], [place] [id number]        Dear JUAN,      Thank you for selecting [facility] for your healthcare needs.    Our records indicate you are due for the following services:     Medication Check    To schedule an appointment or if you have further questions, please contact your primary clinic:   [facility]       [phone]   [facility]       [phone]              [facility]     [phone]      Powered by [employer]    Sincerely,    ZANE AgNP

## 2022-02-15 NOTE — PROGRESS NOTES
Patient:   JUAN SHAW            MRN: [id number]            FIN: [id number]               Age:   21 years     Sex:  Female     :  2000   Associated Diagnoses:   Fatigue; Night sweat   Author:   Kelley Coker      Visit Information      Date of Service: 2021 02:40 pm  Performing Location: [facility]  Encounter#: [id number]      Primary Care Provider (PCP):  NONE ,       Referring Provider:  Kelley Coker    NPI# [id number]      Chief Complaint   2021 2:49 PM CDT     having night sweats for the past 6 months, wakes and feels like she has wet herself, increased fatigue, sleeps late, always tired      History of Present Illness   having increased fatigue and night sweats (but only on the 'bottom half' of her body) for last 6 months. The fatigue has worsened since her COVID vaccine series but second shot was February (she works in an eye clinic). No risk for TB. No hx of anemia. Lives with roommates whom she is struggling with lately as they did have COVID infection. Spends time with boyfriend, he is not ill. Has IUD so doubts pregnancy.   NO N/v/d, no wt loss ,no rash or pain, no FH thyroid disease. There is DM in family. She generally has a healthy diet but hasn't eaten today yet.    hx of asthma, under very good control, See ACT      Health Status   Allergies:    Allergic Reactions (Selected)  No Known Medication Allergies   Medications:  (Selected)   Prescriptions  Prescribed  Clindagel 1% topical gel: 1 ximena, Topical, hs, # 30 gm, 5 Refill(s), Type: Maintenance, Pharmacy: [facility] #[id number], gel or solution okay, 1 ximena Topical hs  Proventil HFA 90 mcg/inh inhalation aerosol: 2 puff(s), Inhale, q4-6 hrs, Instructions: do not fill till she calls, # 1 EA, 1 Refill(s), Type: Maintenance, Pharmacy: [facility] #[id number], 2 puff(s) Inhale q4-6 hrs,Instr:do not fill till she calls  SUMAtriptan 50 mg oral tablet: See Instructions, Instructions: 1/2 - 2 tab(s)  PO Once  may repeat dose once in 2 hours, maximum of 200 mg per 24 hours, PRN: for migraine headache, # 9 tab(s), 11 Refill(s), Type: Soft Stop, Pharmacy: [facility] [id number], 1/2 - 2 tab(s) PO Onc...  Wellbutrin  mg/24 hours oral tablet, extended release: = 1 tab(s) ( 150 mg ), Oral, q 24 hrs, # 90 tab(s), 3 Refill(s), Type: Maintenance, Pharmacy: [facility] #[id number], 1 tab(s) Oral q 24 hrs, 65.5, in, 04/05/20 14:50:00 CDT, Height Measured, Weight Measured  sertraline 100 mg oral tablet: = 1.5 tab(s), Oral, daily, # 135 tab(s), 3 Refill(s), Type: Maintenance, Pharmacy: [facility] #[id number], 1.5 tab(s) Oral daily, 65.5, in, 04/05/20 14:50:00 CDT, Height Measured, 126, lb, 01/31/20 14:48:00 CST, Weight Measured  Documented Medications  Documented  Kyleena 19.5 mg intrauterine device: = 1 EA ( 19.5 mg ), Intrauteral, once, Instructions: inserted by [facility] on 6/14/19, due for removal by 6/14/24., 0 Refill(s), Type: Maintenance,    Medications          *denotes recorded medication          SUMAtriptan 50 mg oral tablet: See Instructions, 1/2 - 2 tab(s) PO Once  may repeat dose once in 2 hours, maximum of 200 mg per 24 hours, PRN: for migraine headache, 9 tab(s), 11 Refill(s).          Proventil HFA 90 mcg/inh inhalation aerosol: 2 puff(s), Inhale, q4-6 hrs, do not fill till she calls, 1 EA, 1 Refill(s).          Wellbutrin  mg/24 hours oral tablet, extended release: 150 mg, 1 tab(s), Oral, q 24 hrs, 90 tab(s), 3 Refill(s).          Clindagel 1% topical gel: 1 ximena, Topical, hs, 30 gm, 5 Refill(s).          *Kyleena 19.5 mg intrauterine device: 19.5 mg, 1 EA, Intrauteral, once, inserted by NCB on 6/14/19, due for removal by 6/14/24., 0 Refill(s).          sertraline 100 mg oral tablet: 1.5 tab(s), Oral, daily, 135 tab(s), 3 Refill(s).       Problem list:    All Problems  Acne vulgaris / SNOMED CT 827419133 / Confirmed  Asthma / SNOMED CT 067974261 / Confirmed  NADNO (generalized anxiety  disorder) / SNOMED CT 94780246 / Confirmed  Mild major depression / SNOMED CT 159252604 / Confirmed  Resolved: H/O: chickenpox / SNOMED CT 217187329      Histories   Past Medical History:    Resolved  H/O: chickenpox ([id number]):  Resolved.   Family History:    Diabetes mellitus  Grandfather (M)  Seizure  Brother  HTN - Hypertension  Grandfather (M)  Grandmother (M)     Procedure history:    Insertion of IUD (SNOMED CT 724737636) performed by Kelley Coker on 6/14/2019 at 19 Years.  Comments:  6/14/2019 2:10 PM CDT - Christine Baez  Consent form signed with NC[facility]. Kyleena IUD placed.    Due for removal by 6/14/2024    Lot:[id number]  Exp: 02/2021  Myringotomy and insertion of tympanic ventilation tube (SNOMED CT 7040575191).   Social History:        Electronic Cigarette/Vaping Assessment            Electronic Cigarette Use: Former use, quit more than 90 days ago.      Alcohol Assessment            Current, 1-2 times per month, 4 drinks/episode average.  5 drinks/episode maximum.      Tobacco Assessment            Never (less than 100 in lifetime)      Home and Environment Assessment            Risks in environment: Gun in the home..        Physical Examination   VS/Measurements   General:  Alert and oriented, No acute distress.    Eye:  Pupils are equal, round and reactive to light, Extraocular movements are intact, Normal conjunctiva.    HENT:  Normocephalic, Tympanic membranes are clear, Normal hearing.    Neck:  Supple, Non-tender, No lymphadenopathy, No thyromegaly.    Respiratory:  Lungs are clear to auscultation, Respirations are non-labored, Breath sounds are equal.    Cardiovascular:  Normal rate, Regular rhythm, No murmur, No gallop, Normal peripheral perfusion.    Gastrointestinal:  Soft, Non-tender, Non-distended.    Musculoskeletal:  Normal range of motion, Normal strength, No tenderness, Normal gait.    Integumentary:  Warm, Pink, Moist, No rash.    Neurologic:  Alert, Oriented, Normal sensory,  Normal motor function, No focal deficits, Cranial Nerves II-XII are grossly intact.    Psychiatric:  Cooperative, Appropriate mood & affect, Normal judgment.       Review / Management   Results review:  upt neg.       Impression and Plan   Diagnosis     Fatigue (SLU08-QK R53.83).     Night sweat (VVE80-QU R61).     Patient Instructions:       Counseled: Patient, Regarding diagnosis, Regarding treatment, Regarding medications, Verbalized understanding.    Orders     Orders (Selected)   Outpatient Orders  Ordered (In Transit)  Basic Metabolic Panel* ([facility]): Specimen Type: Serum, Collection Date: 04/02/21 15:01:00 CDT  CBC (h/h, RBC, indices, WBC, Plt)* ([facility]): Specimen Type: Blood, Collection Date: 04/02/21 15:01:00 CDT  HIV-1/2 Antigen and Antibodies, Fourth Generation, with Reflexes* ([facility]): Specimen Type: Serum, Collection Date: 04/02/21 15:01:00 CDT  TSH* ([facility]): Specimen Type: Serum, Collection Date: 04/02/21 15:01:00 CDT.

## 2022-02-15 NOTE — TELEPHONE ENCOUNTER
---------------------  From: Marla Espitia CMA   Sent: 6/4/2021 3:45:26 PM CDT  Subject: bupropjulius DANIEL     Submitted via covermymeds. Awaiting response.

## 2022-02-15 NOTE — NURSING NOTE
Depression Screening Entered On:  10/29/2019 7:21 AM CDT    Performed On:  10/25/2019 7:19 AM CDT by Sven April               Depression Screening   Little Interest - Pleasure in Activities :   More than half the days   Feeling Down, Depressed, Hopeless :   Several days   Initial Depression Screen Score :   3    Trouble Falling or Staying Asleep :   Nearly every day   Feeling Tired or Little Energy :   Several days   Poor Appetite or Overeating :   Not at all   Feeling Bad About Yourself :   Not at all   Trouble Concentrating :   Not at all   Moving or Speaking Slowly :   Several days   Thoughts Better Off Dead or Hurting Self :   Not at all   Detailed Depression Screen Score :   5    Total Depression Screen Score :   8    NANDO Difficulty with Work, Home, Others :   Somewhat difficult   Sven , April - 10/29/2019 7:19 AM CDT

## 2022-02-15 NOTE — TELEPHONE ENCOUNTER
Entered by Jessica Fox on October 03, 2019 9:51:24 AM CDT  ---------------------  From: Jessica Fox   To: [facility] #[id number]    Sent: 10/3/2019 9:51:24 AM CDT  Subject: Medication Management     ** Not Approved: Patient has requested refill too soon, Pt received a 3 month supply with 1 refill on 8/2019 **  buPROPion (BUPROPION SR 150MG TABLETS (12 H))  TAKE ONE TABLET BY MOUTH EVERY MORNING  Qty:  90 tab(s)        Days Supply:  90        Refills:  0          Substitutions Allowed     Route To Pharmacy - [facility] #[id number]   Signed by Jessica Fox            ------------------------------------------  From: [facility] #[id number]  To: Kelley Coker  Sent: October 2, 2019 12:24:29 PM CDT  Subject: Medication Management  Due: October 3, 2019 12:24:29 PM CDT    ** On Hold Pending Signature **  Drug: buPROPion (Wellbutrin  mg/12 hours oral tablet, extended release)  1 TAB(S) ORAL DAILY,INSTR:ONE TAB EACH MORNING  Quantity: 90 tab(s)     Days Supply: 0         Refills: 0  Substitutions Allowed  Notes from Pharmacy:     Dispensed Drug: buPROPion (buPROPion 150 mg/12 hours (SR) oral tablet, extended release)  TAKE ONE TABLET BY MOUTH EVERY MORNING  Quantity: 90 tab(s)     Days Supply: 90        Refills: 0  Substitutions Allowed  Notes from Pharmacy:   ------------------------------------------

## 2022-02-15 NOTE — NURSING NOTE
Asthma Control Test (ACT) Total Entered On:  12/24/2019 10:45 AM CST    Performed On:  12/24/2019 10:45 AM CST by Christine Baez               Asthma Control Test (ACT) Total   Asthma Control Test Total (Adult) :   25    Asthma Action Plan Provided? :   Yes   ACT- Asthma Action Plan Interventions :   Triggers Identified, Medications Explained, Exacerbation Information, Flu Shot   Christine Baez - 12/24/2019 10:45 AM CST

## 2022-02-15 NOTE — TELEPHONE ENCOUNTER
Entered by Althea Dupree CMA on November 18, 2019 7:12:06 AM CST  ---------------------  From: Althea Dupree CMA   To: [facility] #[id number]    Sent: 11/18/2019 7:12:06 AM CST  Subject: Medication Management     ** Not Approved: 6 month supply sent 8/2/19 **  sertraline (SERTRALINE 100MG TABLETS)  TAKE 1 AND 1/2 TABLETS BY MOUTH DAILY  Qty:  135 tab(s)        Days Supply:  90        Refills:  0          Substitutions Allowed     Route To Pharmacy - [facility] #[id number]   Signed by Althea Dupree CMA            ------------------------------------------  From: [facility] #[id number]  To: Kelley Coker  Sent: November 16, 2019 3:41:05 AM CST  Subject: Medication Management  Due: November 17, 2019 3:41:05 AM CST    ** On Hold Pending Signature **  Drug: sertraline (sertraline 100 mg oral tablet)  1.5 TAB(S) ORAL DAILY  Quantity: 135 tab(s)  Days Supply: 0  Refills: 0  Substitutions Allowed  Notes from Pharmacy: due for appointment 6-2019    Dispensed Drug: sertraline (sertraline 100 mg oral tablet)  TAKE 1 AND 1/2 TABLETS BY MOUTH DAILY  Quantity: 135 tab(s)  Days Supply: 90  Refills: 0  Substitutions Allowed  Notes from Pharmacy:   ------------------------------------------

## 2022-02-15 NOTE — NURSING NOTE
Med Refill  fax request for buproprion from [facility]    Date of last office visit and reason:        Date of last Med Check / Px:   12/24/19  Date of last labs pertaining to med:  na     RTC order in chart:  yes was due visit in Feb. RTC rescheduled due to pandemic. refilled medication for 1 month per protocol. reminder sent to pharmacy for her to return in July.    CMcRoberts cma

## 2022-02-15 NOTE — TELEPHONE ENCOUNTER
Entered by José Manuel Sanchez LPN on August 27, 2021 11:07:59 AM CDT  ---------------------  From: José Manuel Sancehz LPN   To: [facility] #[id number]    Sent: 8/27/2021 11:07:59 AM CDT  Subject: Medication Management     ** Submitted: **  Order:sertraline (sertraline 100 mg oral tablet)  1.5 tab(s)  Oral  daily  Qty:  135 tab(s)        Days Supply:  90        Refills:  0          Substitutions Allowed     Route To Pharmacy - [facility] #[id number]    Signed by José Manuel Sanchez LPN  8/27/2021 4:06:00 PM [facility]    ** Submitted: **  Complete:sertraline (sertraline 100 mg oral tablet)   Signed by José Manuel Sanchez LPN  8/27/2021 4:07:00 PM [facility]    ** Not Approved:  **  sertraline (SERTRALINE 100MG TABLETS)  TAKE 1 AND 1/2 TABLETS BY MOUTH DAILY  Qty:  135 tab(s)        Days Supply:  90        Refills:  0          Substitutions Allowed     [id number]   Signed by José Manuel Sanchez LPN            ------------------------------------------  From: [facility] #[id number]  To: Kelley Coker  Sent: August 26, 2021 4:12:52 AM CDT  Subject: Medication Management  Due: August 20, 2021 11:17:32 AM CDT     ** On Hold Pending Signature **     Dispensed Drug: sertraline (sertraline 100 mg oral tablet), TAKE 1 AND 1/2 TABLETS BY MOUTH DAILY  Quantity: 135 tab(s)  Days Supply: 90  Refills: 0  Substitutions Allowed  Notes from Pharmacy:  ------------------------------------------Medication Refill    PCP:   _    Name of med requested:  _    Date of last office visit and reason:  _      Date of last Med Check / Px:   _    Date of last labs pertaining to med:  _    RTC order in chart:  _    For Protocol refill, has patient been contacted:  _

## 2022-02-15 NOTE — NURSING NOTE
Comprehensive Intake Entered On:  7/23/2020 10:32 AM CDT    Performed On:  7/23/2020 10:29 AM CDT by Juliet Mcdonough MA               Summary   Chief Complaint :   SOB, Chest pain, Fatigue started on Monday; verbal permission for video visit    Menstrual Status :   Menarcheal   Height/Length Estimated :   65.5 in(Converted to: 5 ft 5 in, 166.37 cm)    Juliet Mcdonough MA - 7/23/2020 10:29 AM CDT   Health Status   Allergies Verified? :   Yes   Medication History Verified? :   Yes   Medical History Verified? :   Yes   Pre-Visit Planning Status :   Completed   Tobacco Use? :   Never smoker   Juliet Mcdonough MA - 7/23/2020 10:29 AM CDT   Consents   Consent for Immunization Exchange :   Consent Granted   Consent for Immunizations to Providers :   Consent Granted   Juliet Mcdonough MA - 7/23/2020 10:29 AM CDT   Meds / Allergies   (As Of: 7/23/2020 10:32:39 AM CDT)   Allergies (Active)   No Known Medication Allergies  Estimated Onset Date:   Unspecified ; Created By:   Marla Espitia CMA; Reaction Status:   Active ; Category:   Drug ; Substance:   No Known Medication Allergies ; Type:   Allergy ; Updated By:   Marla Espitia CMA; Reviewed Date:   7/23/2020 10:31 AM CDT        Medication List   (As Of: 7/23/2020 10:32:39 AM CDT)   Prescription/Discharge Order    buPROPion  :   buPROPion ; Status:   Prescribed ; Ordered As Mnemonic:   Wellbutrin  mg/24 hours oral tablet, extended release ; Simple Display Line:   150 mg, 1 tab(s), Oral, q 24 hrs, 90 tab(s), 3 Refill(s) ; Ordering Provider:   Kelley Coker; Catalog Code:   buPROPion ; Order Dt/Tm:   7/17/2020 4:41:28 PM CDT          sertraline  :   sertraline ; Status:   Prescribed ; Ordered As Mnemonic:   sertraline 100 mg oral tablet ; Simple Display Line:   1.5 tab(s), Oral, daily, 135 tab(s), 3 Refill(s) ; Ordering Provider:   Kelley Coker; Catalog Code:   sertraline ; Order Dt/Tm:   7/17/2020 4:41:48 PM CDT          albuterol  :   albuterol ; Status:    Prescribed ; Ordered As Mnemonic:   Proventil HFA 90 mcg/inh inhalation aerosol ; Simple Display Line:   2 puff(s), Inhale, q4-6 hrs, do not fill till she calls, 1 EA, 1 Refill(s) ; Ordering Provider:   Kelley Coker; Catalog Code:   albuterol ; Order Dt/Tm:   12/24/2019 10:04:21 AM CST          clindamycin topical  :   clindamycin topical ; Status:   Prescribed ; Ordered As Mnemonic:   Clindagel 1% topical gel ; Simple Display Line:   1 ximena, Topical, hs, 30 gm, 5 Refill(s) ; Ordering Provider:   Kelley Ckoer; Catalog Code:   clindamycin topical ; Order Dt/Tm:   8/2/2019 1:15:06 PM CDT          SUMAtriptan  :   SUMAtriptan ; Status:   Prescribed ; Ordered As Mnemonic:   SUMAtriptan 50 mg oral tablet ; Simple Display Line:   See Instructions, 1/2 - 2 tab(s) PO Once  may repeat dose once in 2 hours, maximum of 200 mg per 24 hours, PRN: for migraine headache, 9 tab(s), 11 Refill(s) ; Ordering Provider:   Kelley Coker; Catalog Code:   SUMAtriptan ; Order Dt/Tm:   10/11/2018 8:05:52 PM CDT            Home Meds    levonorgestrel  :   levonorgestrel ; Status:   Documented ; Ordered As Mnemonic:   Kyleena 19.5 mg intrauterine device ; Simple Display Line:   19.5 mg, 1 EA, Intrauteral, once, inserted by NCB on 6/14/19, due for removal by 6/14/24., 0 Refill(s) ; Catalog Code:   levonorgestrel ; Order Dt/Tm:   6/14/2019 2:08:42 PM CDT            ID Risk Screen   Recent Travel History :   No recent travel   Family Member Travel History :   No recent travel   Other Exposure to Infectious Disease :   Unknown   Juliet Mcdonough MA - 7/23/2020 10:29 AM CDT

## 2022-02-15 NOTE — PROGRESS NOTES
Chief Complaint    Depression medication check. Would like to discuss changing medication. Increased anxiety.  History of Present Illness       4//19/2018 The patient is a 18-year-old here to discuss depression.  Her parents are not with her but she gives me permission to call her mom and at the end of the visit I did that.  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  She is an excellent student and has current GPA of 3.8.  She has been struggling with depressive type symptoms and her mom is becoming concerned and wanted her to be evaluated.  Of note is the unfortunate fact that there was a suicide in the school district in the last month.  The patient acknowledges that this is probably part of the reason for her mom s concern but the patient has struggled with depression a good portion of her life.  She has never sought care with medications though she has used two other counselors and did not find that helpful.  The patient tells me she is unable to communicate and talk with people and that makes it hard for her in counseling.          Her biggest concern is she feels very nervous, anxious, and on the edge most of the time.  She also gets very irritable.  Patient states she really has very little interest in doing anything.  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  She is a very good student and taking difficult courses such as chemistry and genetics.  She hopes to go to [employer] upon graduation and study biochemistry.          The patient has a boyfriend of one year who is very supportive of her and actually encouraged her to seek medical care to get help with her depression.  She cannot tell me names of any friends that she has at school.  Patient states she was bullied in grade school in [place] before moving to [place] but denies any current bullying.          FAMILY HISTORY: Mom suffers from  depression although the patient is not sure if she has ever used any medication.  There also seems to be an uncle and grandmother who also have depression.         She has four siblings and she is the second oldest.  Her older brother goes to school at the [employer].  She seems to enjoy her sibling but when they are all together it gets very stressful.        3/19/2019   Marla has been on sertraline 150 mg for a few months and was doing well till January, feeling very anxious.      sleep is ok, diet is good, she started exercising and did start yoga.      She is active in Mock Trial and [employer], she will graduate this spring and travel to [facility] with school. She will go to [employer] next year after graduation      her PHQ 9 score is 16, NANDO 7 score is 13      she can't pinpoint the source of anxiety, may be upcoming graduation      would like to trial med adjustment            Physical Exam   Vitals & Measurements    T: 97.9   F (Tympanic)  HR: 70(Peripheral)  BP: 106/64  SpO2: 99%     HT: 65.5 in  WT: 129 lb       a/ox3 NAD, smiling good eye contact      lungs clear heart regular  Assessment/Plan       Mild major depression (F32.0)          will add bupropion during daytime hours, can increase to XL if needed.        IF doing well with this after a few months, consider reducing sertraline to 100mg daily        she will [facility] me in [facility]        15 mi counseling       Orders:         buPROPion, = 1 tab(s) ( 150 mg ), PO, daily, Instructions: one tab each morning, # 90 tab(s), 0 Refill(s), Type: Maintenance, Pharmacy: [facility] [id number], 1 tab(s) Oral daily,Instr:one tab each morning, (Ordered)         sertraline, = 1.5 tab(s) ( 150 mg ), PO, Daily, # 135 tab(s), 0 Refill(s), Type: Maintenance, Pharmacy: [facility] [id number], 1.5 tab(s) Oral daily, (Ordered)  Patient Information     Name:MARLA SHAW      Address:      [address] [place] [address]  [id number]     Sex:Female     YOB: 2000     [phone]     Emergency Contact:MARCO SHAW     MRN:[id number]     FIN:[id number]     Location:[facility]     Date of Service:2019      Primary Care Physician:       NONE ,       Attending Physician:       Kelley Coker, [phone]  Problem List/Past Medical History    Ongoing     Asthma     Mild major depression    Historical     No qualifying data  Medications     albuterol 90mcg/inhalation MDI: 0 Refill(s), Type: Maintenance.     Flovent  mcg/inh inhalation aerosol: 2 puff(s), inh, bid.     Advair Diskus 250 mcg-50 mcg inhalation powder: 2 puff(s), inh, bid, 0 Refill(s).     ondansetron 4 mg oral tablet, disintegratin mg, 1 tab(s), PO, q8 hrs, 10 tab(s), 1 Refill(s).     SUMAtriptan 50 mg oral tablet: See Instructions, 1/2 - 2 tab(s) PO Once  may repeat dose once in 2 hours, maximum of 200 mg per 24 hours, PRN: for migraine headache, 9 tab(s), 11 Refill(s).     fluconazole 150 mg oral tablet: 150 mg, 1 tab(s), PO, Once, may repeat dose after 3 days if still symptomatic, 2 tab(s), 0 Refill(s).     Karen 3 mg-0.03 mg oral tablet: 1 tab(s), po, daily, 84 tab(s), 4 Refill(s).     Wellbutrin  mg/12 hours oral tablet, extended release: 150 mg, 1 tab(s), PO, daily, one tab each morning, 90 tab(s), 0 Refill(s).     sertraline 100 mg oral tablet: 150 mg, 1.5 tab(s), PO, Daily, 135 tab(s), 0 Refill(s).          Allergies    No Known Medication Allergies  Social History    Smoking Status - 2019     Never smoker     Alcohol      Current, 1-2 times per month, 4 drinks/episode average. 5 drinks/episode maximum., 2018     Home and Environment      Risks in environment: Gun in the home.., 2014     Tobacco      Never, 2014  Family History    Diabetes mellitus: Grandfather (M).    HTN - Hypertension: Grandfather (M) and Grandmother (M).    Seizure: Brother.  Immunizations      Vaccine Date  Status Comments      meningococcal conjugate vaccine 10/11/2018 Given      influenza virus vaccine, inactivated 10/11/2018 Given      Hep A, pediatric/adolescent 09/01/2015 Given      tetanus/diphth/pertuss (Tdap) adult/adol 09/01/2015 Given      human papillomavirus vaccine 09/01/2015 Given      Hep A, pediatric/adolescent 04/23/2015 Given      human papillomavirus vaccine 04/23/2015 Given      meningococcal conjugate vaccine 07/23/2013 Recorded      human papillomavirus vaccine 07/23/2013 Recorded      influenza virus vaccine, inactivated 11/28/2012 Recorded      tetanus/diphth/pertuss (Tdap) adult/adol 11/28/2012 Recorded      human papillomavirus vaccine 11/28/2012 Recorded      DTaP 08/21/2006 Recorded      MMR (measles/mumps/rubella) 08/21/2006 Recorded      IPV 08/21/2006 Recorded      DTaP 09/03/2003 Recorded      varicella 09/13/2002 Recorded      Hep B 09/13/2002 Recorded      Hep B-Hib 03/27/2002 Recorded      MMR (measles/mumps/rubella) 03/27/2002 Recorded      DTaP 2001 Recorded      pneumococcal (PCV7) 2001 Recorded      IPV 2001 Recorded      DTaP 2001 Recorded      Hib (PRP-T) 2001 Recorded      IPV 2001 Recorded      DTaP 2001 Recorded      Hep B-Hib 2001 Recorded      IPV 2001 Recorded      Hep B 2001 Recorded      pneumococcal (PCV7) - Not Given Not Necessary      pneumococcal (PCV7) - Not Given Not Necessary      pneumococcal (PCV7) - Not Given Not Necessary      Hib (HbOC) - Not Given Not Necessary

## 2022-02-15 NOTE — NURSING NOTE
Comprehensive Intake Entered On:  1/8/2020 9:07 AM CST    Performed On:  1/8/2020 9:04 AM CST by Elina Parra LPN               Summary   Chief Complaint :   wisdom teeth removed last Friday, cough started Sunday, and throat started to hurt yesterday, feels like there is something stuck in her throat, fever   Menstrual Status :   Menarcheal   Weight Measured :   126.8 lb(Converted to: 126 lb 13 oz, 57.52 kg)    Systolic Blood Pressure :   94 mmHg   Diastolic Blood Pressure :   58 mmHg (LOW)    Mean Arterial Pressure :   70 mmHg   Peripheral Pulse Rate :   96 bpm   BP Site :   Right arm   BP Method :   Manual   Temperature Tympanic :   98.8 DegF(Converted to: 37.1 DegC)    Oxygen Saturation :   99 %   Elina Parra LPN - 1/8/2020 9:04 AM CST   Health Status   Allergies Verified? :   Yes   Medication History Verified? :   Yes   Medical History Verified? :   No   Pre-Visit Planning Status :   Not completed   Tobacco Use? :   Never smoker   Elina Parra LPN - 1/8/2020 9:04 AM CST   Meds / Allergies   (As Of: 1/8/2020 9:07:59 AM CST)   Allergies (Active)   No Known Medication Allergies  Estimated Onset Date:   Unspecified ; Created By:   Marla Espitia CMA; Reaction Status:   Active ; Category:   Drug ; Substance:   No Known Medication Allergies ; Type:   Allergy ; Updated By:   Marla Espitia CMA; Reviewed Date:   12/24/2019 9:49 AM CST        Medication List   (As Of: 1/8/2020 9:07:59 AM CST)   Prescription/Discharge Order    buPROPion  :   buPROPion ; Status:   Prescribed ; Ordered As Mnemonic:   Wellbutrin  mg/24 hours oral tablet, extended release ; Simple Display Line:   150 mg, 1 tab(s), Oral, q 24 hrs, 90 EA, 1 Refill(s) ; Ordering Provider:   Kelley Coker; Catalog Code:   buPROPion ; Order Dt/Tm:   12/24/2019 10:02:00 AM CST          sertraline  :   sertraline ; Status:   Prescribed ; Ordered As Mnemonic:   sertraline 100 mg oral tablet ; Simple Display Line:   1.5 tab(s), Oral,  daily, 135 tab(s), 3 Refill(s) ; Ordering Provider:   Kelley Coker; Catalog Code:   sertraline ; Order Dt/Tm:   12/24/2019 10:03:38 AM CST          clindamycin topical  :   clindamycin topical ; Status:   Prescribed ; Ordered As Mnemonic:   Clindagel 1% topical gel ; Simple Display Line:   1 ximena, Topical, hs, 30 gm, 5 Refill(s) ; Ordering Provider:   Kelley Coker; Catalog Code:   clindamycin topical ; Order Dt/Tm:   8/2/2019 1:15:06 PM CDT          ondansetron  :   ondansetron ; Status:   Prescribed ; Ordered As Mnemonic:   ondansetron 4 mg oral tablet, disintegrating ; Simple Display Line:   4 mg, 1 tab(s), PO, q8 hrs, 10 tab(s), 1 Refill(s) ; Ordering Provider:   Kelley Coker; Catalog Code:   ondansetron ; Order Dt/Tm:   5/7/2018 7:00:49 PM CDT          albuterol  :   albuterol ; Status:   Prescribed ; Ordered As Mnemonic:   Proventil HFA 90 mcg/inh inhalation aerosol ; Simple Display Line:   2 puff(s), Inhale, q4-6 hrs, do not fill till she calls, 1 EA, 1 Refill(s) ; Ordering Provider:   Kelley Coker; Catalog Code:   albuterol ; Order Dt/Tm:   12/24/2019 10:04:21 AM CST          SUMAtriptan  :   SUMAtriptan ; Status:   Prescribed ; Ordered As Mnemonic:   SUMAtriptan 50 mg oral tablet ; Simple Display Line:   See Instructions, 1/2 - 2 tab(s) PO Once  may repeat dose once in 2 hours, maximum of 200 mg per 24 hours, PRN: for migraine headache, 9 tab(s), 11 Refill(s) ; Ordering Provider:   Kelley Coker; Catalog Code:   SUMAtriptan ; Order Dt/Tm:   10/11/2018 8:05:52 PM CDT            Home Meds    levonorgestrel  :   levonorgestrel ; Status:   Documented ; Ordered As Mnemonic:   Kyleena 19.5 mg intrauterine device ; Simple Display Line:   19.5 mg, 1 EA, Intrauteral, once, inserted by [facility] on 6/14/19, due for removal by 6/14/24., 0 Refill(s) ; Catalog Code:   levonorgestrel ; Order Dt/Tm:   6/14/2019 2:08:42 PM CDT

## 2022-02-15 NOTE — TELEPHONE ENCOUNTER
---------------------  From: Melissa Bautista CMA   To: Appointment Pool ([id number]_[place]);     Sent: 7/28/2020 8:55:51 AM CDT  Subject: Curbside testing appt     Pt canceled her covid curbside appt on 7/23 per [facility]. Will you please try calling pt to see if she wants to be rescheduled or did she have testing done elsewhere?  Thank you.Patient accidently went to the [facility] testing site.  She was tested at [facility].---------------------  From: Anna Carbajal (Appointment Pool ([id number]))   To: Melissa Bautista CMA;     Sent: 7/28/2020 9:22:07 AM CDT  Subject: RE: Curbside testing appt---------------------  From: Melissa Bautista CMA   To: Dayan LIMON Wilson;     Sent: 7/28/2020 11:38:13 AM CDT  Subject: FW: Curbside testing appt     FYI-Pt ended up having curbside testing done at the [facility] site. I will cancel the order in our chart.

## 2022-02-15 NOTE — PROGRESS NOTES
Patient:   JUAN SHAW            MRN: [id number]            FIN: [id number]               Age:   18 years     Sex:  Female     :  2000   Associated Diagnoses:   Mild major depression; Viral URI with cough; Vomiting   Author:   Kelley Coker      Visit Information      Date of Service: 2018 06:26 pm  Performing Location: [facility]  Encounter#: [id number]      Primary Care Provider (PCP):  NONE ,       Referring Provider:  Kelley Coker    NPI# [id number]      Chief Complaint   2018 6:41 PM CDT     Patient presents for vomited x2 had blood in vomit today, fever, productive coughsputum yellow, body aches x 2 days        History of Present Illness   reviewed presenting problem as above with patient  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 Scant blood, she was coughing at the same time  Has used zofran in past for nause with her migraines, ran out  It was a hot weekend, she used sun screen and drank 'lots' of water    She has been on sertraline 2 weeks and very happy with it, able to be be more social with out anxiety now, requests to increase dose      Review of Systems   Constitutional:  Fever, No chills.    Ear/Nose/Mouth/Throat:  Nasal congestion, No ear pain, No sore throat.    Respiratory:  Cough, No shortness of breath, No wheezing.    Gastrointestinal:  Nausea, Vomiting, No diarrhea.              Health Status   Allergies:    Allergic Reactions (Selected)  No Known Medication Allergies   Medications:  (Selected)   Prescriptions  Prescribed  SUMAtriptan 50 mg oral tablet: See Instructions, Instructions: 1/2 - 2 tab(s) PO Once  may repeat dose once in 2 hours, maximum of 200 mg per 24 hours, PRN: for migraine headache, # 9 tab(s), 11 Refill(s), Type: Soft Stop, Pharmacy[id number], 1/2 - 2 tab(s) PO Onc...  ondansetron 4 mg oral tablet, disintegratin tab(s) ( 4 mg ), PO, q8 hrs, # 10 tab(s), 1 Refill(s), Type: Maintenance, Pharmacy: [facility] [id number], 1 tab(s) po q8 hrs  sertraline 100 mg oral tablet: 1 tab(s) ( 100 mg ), PO, Daily, # 90 tab(s), 0 Refill(s), Type: Maintenance, Pharmacy: [facility] [id number], 1 tab(s) po daily  sertraline 50 mg oral tablet: 1 tab(s) ( 50 mg ), PO, Daily, # 30 tab(s), 1 Refill(s), Type: Maintenance, Pharmacy: [facility] [id number], 1 tab(s) po daily  Documented Medications  Documented  Advair Diskus 250 mcg-50 mcg inhalation powder: 2 puff(s), inh, bid, 0 Refill(s), Type: Maintenance  Flovent  mcg/inh inhalation aerosol: 2 puff(s), inh, bid, 0 Refill(s), Type: Maintenance  Karen 3 mg-0.03 mg oral tablet: 1 tab(s), po, daily, 0 Refill(s), Type: Maintenance  albuterol 90mcg/inhalation MDI: 0 Refill(s), Type: Maintenance   Problem list:    All Problems  Asthma / SNOMED CT 581481930 / Confirmed  Mild major depression / SNOMED CT 268146533 / Confirmed      Histories   Past Medical History:    No active or resolved past medical history items have been selected or recorded.   Family History:    Diabetes mellitus  Grandfather (M)  Seizure  Brother  HTN - Hypertension  Grandfather (M)  Grandmother (M)     Procedure history:    No active procedure history items have been selected or recorded.   Social History:        Alcohol Assessment            Current, 1-2 times per month      Tobacco Assessment            Never      Home and Environment Assessment            Risks in environment: Gun in the home..        Physical Examination   Vital Signs   2018 6:41 PM CDT Temperature Tympanic 99.1 DegF    Peripheral Pulse Rate 104 bpm  HI    HR Method Electronic    Systolic Blood Pressure 102 mmHg    Diastolic Blood Pressure 74 mmHg    Mean Arterial Pressure 83 mmHg    BP Site Right arm    BP Method Manual    Oxygen Saturation 98 %       Measurements from flowsheet : Measurements   2018 6:41 PM CDT Height Measured - Standard 65.5 in    Weight Measured - Standard 121.4 lb    BSA 1.59 m2    Body Mass Index 19.89 kg/m2    Body Mass Index Percentile 34.69      General:  Alert and oriented, No acute distress.    Eye:  Normal conjunctiva.    HENT:  Tympanic membranes are clear, Normal hearing, Oral mucosa is moist, No pharyngeal erythema, No sinus tenderness, nasal congestion with conversation.    Neck:  Supple, Non-tender, No lymphadenopathy.    Respiratory:  Lungs are clear to auscultation, Respirations are non-labored, Breath sounds are equal, Symmetrical chest wall expansion.    Cardiovascular:  Normal rate, Regular rhythm, No murmur.    Gastrointestinal:  Soft, Non-tender, Non-distended, No organomegaly.    Musculoskeletal:  Normal range of motion, Normal gait.    Integumentary:  Warm, Dry, Pink, No rash.    Neurologic:  Alert, Oriented.    Psychiatric:  Cooperative.       Impression and Plan   Diagnosis     Mild major depression (JJV35-YM F32.0).     Viral URI with cough (EES42-EX J06.9).     Vomiting (LVN31-WH R11.10).     Patient Instructions:       Counseled: Patient, Regarding diagnosis, Regarding treatment, Regarding medications, Verbalized understanding, Counseled on symptomatic management. Return to clinic for re evaluation if worsening, simply not improving, or failure to resolve.   gatorade and bananas tonight with bland , suspect GI symptoms are viral as is URI  will increase sertraline.    Orders     Orders (Selected)   Prescriptions  Prescribed  ondansetron 4 mg oral tablet, disintegratin tab(s) ( 4 mg ), PO, q8 hrs, # 10 tab(s), 1 Refill(s), Type: Maintenance, Pharmacy: [facility] [id number], 1 tab(s) po q8 hrs  sertraline 100 mg oral tablet: 1 tab(s) ( 100 mg ), PO, Daily, # 90 tab(s), 0 Refill(s), Type: Maintenance, Pharmacy: [facility] [id number], 1 tab(s) po daily.

## 2022-02-15 NOTE — LETTER
(Inserted Image. Unable to display)            April 12, 2021      [place] [address]        Dear MARLA,    Thank you for selecting [facility] for your healthcare needs.  Below you will find the results of the recent tests done at our clinic.      The test for active tuberculosis infection is negative, Marla.  If the fatigue or weight loss persists, please schedule a follow up appointment with me, it could be in person or virtual. Thank you.      Result Name Current Result Reference Range   QuantiFERON TB Gold  NEGATIVE 4/9/2021 NEGATIVE - NEGATIVE   QuantiFERON Nil Value (IU/mL)  0.01 4/9/2021    QuantiFERON Mitogen Minus Nil (IU/mL)  7.76 4/9/2021    QuantiFERON- TB minus NIL (IU/mL)  0.00 4/9/2021    Quantiferon-TB2 minus NIL (IU/mL)  0.00 4/9/2021        Please contact me or my assistant at [phone] if you have any questions or concerns.     Sincerely,        KELTON Johns  Family Nurse Practitioner      What do your labs mean?  Below is a glossary of commonly ordered labs:  LDL   Bad Cholesterol   HDL   Good Cholesterol  AST/ALT   Liver Function   Cr/Creatinine   Kidney Function  Microalbumin   Kidney Function  BUN   Kidney Function  PSA   Prostate    TSH   Thyroid Hormone  HgbA1c   Diabetes Test   Hgb (Hemoglobin)   Red Blood Cells  WBC   White Blood Cell Count

## 2022-02-15 NOTE — PROGRESS NOTES
Patient:   JUAN SHAW            MRN: [id number]            FIN: [id number]               Age:   19 years     Sex:  Female     :  2000   Associated Diagnoses:   Counseling for travel   Author:   Onur MARIN, Brando STACK      Visit Information   Travel visit:      Chief Complaint   2019 6:51 PM CDT     Pt here for a Travel Px to the [facility] and [facility] from -        History of Present Illness   Chief complaint and symptoms noted above and confirmed with patient   as above, traveling to [facility] and [facility] with a high school group  she needs 2nd Hep A and typhoid  she will check on whether or not malaria prophylaxis is required and if they need rabies vaccine         Review of Systems   Constitutional:  Negative.    Ear/Nose/Mouth/Throat:  Negative.    Respiratory:  Negative.       Health Status   Allergies:    Allergic Reactions (Selected)  No Known Medication Allergies   Medications:  (Selected)   Prescriptions  Prescribed  SUMAtriptan 50 mg oral tablet: See Instructions, Instructions: 1/2 - 2 tab(s) PO Once  may repeat dose once in 2 hours, maximum of 200 mg per 24 hours, PRN: for migraine headache, # 9 tab(s), 11 Refill(s), Type: Soft Stop, [facility]acy: [id number], 1/2 - 2 tab(s) PO Onc...  Wellbutrin  mg/12 hours oral tablet, extended release: = 1 tab(s) ( 150 mg ), PO, daily, Instructions: one tab each morning, # 90 tab(s), 0 Refill(s), Type: Maintenance, Pharmacy: [id number], 1 tab(s) Oral daily,Instr:one tab each morning  Karen 3 mg-0.03 mg oral tablet: 1 tab(s), po, daily, # 84 tab(s), 4 Refill(s), Type: Maintenance, Pharmacy: [id number], 1 tab(s) Oral daily  fluconazole 150 mg oral tablet: = 1 tab(s) ( 150 mg ), PO, Once, Instructions: may repeat dose after 3 days if still symptomatic, # 2 tab(s), 0 Refill(s), Type: Soft Stop, Pharmacy: [id number], 1 tab(s) Oral once,Instr:may repeat dose after 3 days if still  symptomatic...  ondansetron 4 mg oral tablet, disintegratin tab(s) ( 4 mg ), PO, q8 hrs, # 10 tab(s), 1 Refill(s), Type: Maintenance, [id number], 1 tab(s) po q8 hrs  sertraline 100 mg oral tablet: = 1.5 tab(s) ( 150 mg ), PO, Daily, # 135 tab(s), 0 Refill(s), Type: Maintenance, [id number], 1.5 tab(s) Oral daily  Documented Medications  Documented  Advair Diskus 250 mcg-50 mcg inhalation powder: 2 puff(s), inh, bid, 0 Refill(s), Type: Maintenance  Flovent  mcg/inh inhalation aerosol: 2 puff(s), inh, bid, 0 Refill(s), Type: Maintenance  albuterol 90mcg/inhalation MDI: 0 Refill(s), Type: Maintenance   Problem list:    All Problems  Mild major depression / SNOMED CT 655553766 / Confirmed  Asthma / SNOMED CT 745509337 / Confirmed  Resolved: H/O: chickenpox / SNOMED CT 035734785      Histories   Past Medical History:    Resolved  H/O: chickenpox ([id number]):  Resolved.   Family History:    Diabetes mellitus  Grandfather (M)  Seizure  Brother  HTN - Hypertension  Grandfather (M)  Grandmother (M)     Procedure history:    Myringotomy and insertion of tympanic ventilation tube ([id number]).   Social History:        Alcohol Assessment            Current, 1-2 times per month, 4 drinks/episode average.  5 drinks/episode maximum.      Tobacco Assessment            Never      Home and Environment Assessment            Risks in environment: Gun in the home..      Physical Examination   Vital Signs   2019 6:51 PM CDT Temperature Tympanic 97.3 DegF  LOW    Peripheral Pulse Rate 80 bpm    Respiratory Rate 16 br/min    Systolic Blood Pressure 94 mmHg    Diastolic Blood Pressure 68 mmHg    Mean Arterial Pressure 77 mmHg    BP Site Right arm      Measurements from flowsheet : Measurements   2019 6:51 PM CDT Height Measured - Standard 65.5 in    Weight Measured - Standard 127 lb    BSA 1.63 m2    Body Mass Index 20.81 kg/m2    Body Mass Index Percentile 43.18       General:  No acute distress.    HENT:  Tympanic membranes are clear, No pharyngeal erythema, No sinus tenderness.    Neck:  Supple, Non-tender, No lymphadenopathy.    Respiratory:  Lungs are clear to auscultation.    Cardiovascular:  Normal rate, Regular rhythm, No murmur.    Psychiatric:  Appropriate mood & affect.       Impression and Plan   Diagnosis     Counseling for travel (UNT24-KO Z71.89).     Plan:  discussed CDC website recommendations, prevention of traveler's diarrhea (use Imodium, given Rx for zithromax), malaria precautions.    Summary:  will give 2nd Hep A and typhoid vaccines today, she will check on whether or not she needs malaria prophylaxis and rabies vaccine.    Orders     Orders   Pharmacy:  azithromycin 500 mg oral tablet (Prescribe): = 1 tab(s) ( 500 mg ), po, daily, x 3 day(s), Instructions: for severe diarrhea, # 3 tab(s), 0 Refill(s), Type: Maintenance, Pharmacy: [id number], 1 tab(s) Oral daily,x3 day(s),Instr:for severe diarrhea.     Orders   Charges (Evaluation and Management):  [id number] office outpatient visit 15 minutes (Charge) (Order): Quantity: 1, Counseling for travel.

## 2022-02-15 NOTE — PROGRESS NOTES
Patient:   JUAN SHAW            MRN: [id number]            FIN: [id number]               Age:   18 years     Sex:  Female     :  2000   Associated Diagnoses:   Acute cystitis   Author:   Kelley Coker      Chief Complaint   2018 1:42 PM CDT     f/u UTI, states when she was treated last it did not help.      History of Present Illness   Concerning symptoms as listed in Chief Complaint above discussed and confirmed with patient   treated twice this summer for UTI, both times with macrobid (once thru a virtuelle)  has urgency and frequency  took a med for pain  no fever  sexually acive iwth new partner 1 month  on oc's, LMP 3 weeks  no hx of being tested for chlamydia      Review of Systems   Constitutional:  No fever, No chills.    Gastrointestinal:  No nausea, No vomiting.       Health Status   Allergies:    Allergic Reactions (Selected)  No Known Medication Allergies   Medications:  (Selected)   Prescriptions  Prescribed  SUMAtriptan 50 mg oral tablet: See Instructions, Instructions: 1/2 - 2 tab(s) PO Once  may repeat dose once in 2 hours, maximum of 200 mg per 24 hours, PRN: for migraine headache, # 9 tab(s), 11 Refill(s), Type: Soft Stop, Pharmacy: [id number], 1/2 - 2 tab(s) PO Onc...  ondansetron 4 mg oral tablet, disintegratin tab(s) ( 4 mg ), PO, q8 hrs, # 10 tab(s), 1 Refill(s), Type: Maintenance, Pharmacy: [id number], 1 tab(s) po q8 hrs  sertraline 100 mg oral tablet: 1 tab(s) ( 100 mg ), PO, Daily, # 90 tab(s), 0 Refill(s), Type: Maintenance, Pharmacy: [id number], 1 tab(s) po daily  sertraline 100 mg oral tablet: = 1.5 tab(s) ( 150 mg ), PO, Daily, # 135 tab(s), 0 Refill(s), Type: Maintenance, Pharmacy: [id number], 1.5 tab(s) Oral daily,x90 day(s)  Documented Medications  Documented  Advair Diskus 250 mcg-50 mcg inhalation powder: 2 puff(s), inh, bid, 0 Refill(s), Type: Maintenance  Flovent  mcg/inh inhalation  aerosol: 2 puff(s), inh, bid, 0 Refill(s), Type: Maintenance  Karen 3 mg-0.03 mg oral tablet: 1 tab(s), po, daily, 0 Refill(s), Type: Maintenance  albuterol 90mcg/inhalation MDI: 0 Refill(s), Type: Maintenance   Problem list:    All Problems  Asthma / SNOMED CT 318973138 / Confirmed  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   Family History:    Diabetes mellitus  Grandfather (M)  Seizure  Brother  HTN - Hypertension  Grandfather (M)  Grandmother (M)     Procedure history:    No active procedure history items have been selected or recorded.   Social History:        Alcohol Assessment            Current, 1-2 times per month      Tobacco Assessment            Never      Home and Environment Assessment            Risks in environment: Gun in the home..        Physical Examination   Vital Signs   9/4/2018 1:42 PM CDT Temperature Tympanic 98.5 DegF    Peripheral Pulse Rate 76 bpm    Pulse Site Radial artery    HR Method Manual    Systolic Blood Pressure 120 mmHg    Diastolic Blood Pressure 68 mmHg    Mean Arterial Pressure 85 mmHg    BP Site Right arm    BP Method Manual      Measurements from flowsheet : Measurements   9/4/2018 1:42 PM CDT Height Measured - Standard 65.5 in    Weight Measured - Standard 129.8 lb    BSA 1.65 m2    Body Mass Index 21.27 kg/m2    Body Mass Index Percentile 51.75      General:  Alert and oriented, No acute distress.    Genitourinary:  No costovertebral angle tenderness.    Integumentary:  Warm, Dry, Pink, No rash.       Review / Management   Results review:  Lab results   9/4/2018 2:23 PM CDT UA Epithelial Cells Few    UA Mucous Present    UA WBC     UA RBC 3-5    UA Bacteria Moderate   9/4/2018 2:10 PM CDT UA pH 7.0    UA Specific Gravity 1.015    UA Glucose TRACE    UA Bilirubin 1+    UA Ketones TRACE    Urine Occult Blood NEGATIVE    UA Protein 2+     UA Nitrite POSITIVE    UA Leukocyte Esterase 3+   .       Impression and Plan   Diagnosis     Acute cystitis (SIF19-AG N30.00).     Patient Instructions:       Counseled: Patient, Regarding diagnosis, Regarding treatment, Regarding medications, Verbalized understanding.    Orders     Orders (Selected)   Outpatient Orders  Ordered (In Transit)  Chlamydia/Neisseria gonorrhoeae RNA, TMA* ([facility]): Specimen Type: Urine, Collection Date: 09/04/18 14:04:00 CDT  Culture, Urine, Routine* ([facility]): Specimen Type: Urine (Clean Catch), Collection Date: 09/04/18 14:04:00 CDT  Prescriptions  Prescribed  Bactrim  mg-160 mg oral tablet: 1 tab(s), PO, BID, # 20 tab(s), 0 Refill(s), Type: Maintenance, Pharmacy: [id number], 1 tab(s) Oral bid,x10 day(s).     Make a practice of using the bathroom to urinate after intercourse,  consuming adequate water daily (between 6-8 glasses), do not 'hold' your urine,  avoid bladder irritants--soda, caffeine, alcohol   Return to the clinic for re evaluation if worsening or simply not improving.   .     will choose different antibiotic and await culture.

## 2022-02-15 NOTE — LETTER
(Inserted Image. Unable to display) January 22, 2019Re: JUAN VALEIRADOB:  2000Pau Zuniga [facility], [id number] [place] [address]Sh:  EfrainThe following patient has been referred to your office/practice:   JUAN CARLSONOGENAppointment : 02/04/2019Location : [facility], [place]Please refer to the attached clinical documentation for a summary of JUAN's care.  Please do not hesitate to contact our office if any additional clinical questions arise. All relevant records and transition of care documents should be mailed or faxed.Your assistance in providing continuity of care is appreciatedSincerely, [facility] & [address] [address](P) [phone](F) [phone]

## 2022-02-15 NOTE — NURSING NOTE
Comprehensive Intake Entered On:  10/28/2021 12:18 PM CDT    Performed On:  10/28/2021 12:14 PM CDT by Elina Parra LPN               Summary   Chief Complaint :   medication refill on Buproprion and Sertraline, has been without x1 week - verbal consent for telephone visit   Menstrual Status :   Menarcheal   Height/Length Estimated :   65.5 in(Converted to: 5 ft 5 in, 166.37 cm)    Elina Parra LPN - 10/28/2021 12:14 PM CDT   Health Status   Allergies Verified? :   Yes   Medication History Verified? :   Yes   Medical History Verified? :   Yes   Pre-Visit Planning Status :   Not completed   Tobacco Use? :   Never smoker   Elina Parra LPN - 10/28/2021 12:14 PM CDT   Meds / Allergies   (As Of: 10/28/2021 12:18:44 PM CDT)   Allergies (Active)   No Known Medication Allergies  Estimated Onset Date:   Unspecified ; Created By:   Marla Espitia CMA; Reaction Status:   Active ; Category:   Drug ; Substance:   No Known Medication Allergies ; Type:   Allergy ; Updated By:   Marla Espitia CMA; Reviewed Date:   7/23/2020 10:31 AM CDT        Medication List   (As Of: 10/28/2021 12:18:44 PM CDT)   Prescription/Discharge Order    albuterol  :   albuterol ; Status:   Prescribed ; Ordered As Mnemonic:   Proventil HFA 90 mcg/inh inhalation aerosol ; Simple Display Line:   2 puff(s), Inhale, q4-6 hrs, do not fill till she calls, 1 EA, 1 Refill(s) ; Ordering Provider:   Kelley Coker; Catalog Code:   albuterol ; Order Dt/Tm:   12/24/2019 10:04:21 AM CST          buPROPion  :   buPROPion ; Status:   Prescribed ; Ordered As Mnemonic:   buPROPion 150 mg/24 hours (XL) oral tablet, extended release ; Simple Display Line:   1 tab(s), Oral, q 24 hrs, 30 tab(s), 0 Refill(s) ; Ordering Provider:   Kelley Coker; Catalog Code:   buPROPion ; Order Dt/Tm:   9/10/2021 12:58:40 PM CDT          clindamycin topical  :   clindamycin topical ; Status:   Prescribed ; Ordered As Mnemonic:   Clindagel 1% topical gel ; Simple  Display Line:   1 ximena, Topical, hs, 30 gm, 5 Refill(s) ; Ordering Provider:   Kelley Coker; Catalog Code:   clindamycin topical ; Order Dt/Tm:   8/2/2019 1:15:06 PM CDT          fluticasone  :   fluticasone ; Status:   Prescribed ; Ordered As Mnemonic:   fluticasone CFC free 110 mcg/inh inhalation aerosol ; Simple Display Line:   2 puff(s), inh, bid, rinse mouth and throat after use, 3 EA, 0 Refill(s) ; Ordering Provider:   Kelley Coker; Catalog Code:   fluticasone ; Order Dt/Tm:   5/27/2021 8:44:36 AM CDT          sertraline  :   sertraline ; Status:   Prescribed ; Ordered As Mnemonic:   sertraline 100 mg oral tablet ; Simple Display Line:   1.5 tab(s), Oral, daily, 135 tab(s), 0 Refill(s) ; Ordering Provider:   Kelley Coker; Catalog Code:   sertraline ; Order Dt/Tm:   8/27/2021 11:06:02 AM CDT            Home Meds    levonorgestrel  :   levonorgestrel ; Status:   Documented ; Ordered As Mnemonic:   Kyleena 19.5 mg intrauterine device ; Simple Display Line:   19.5 mg, 1 EA, Intrauteral, once, inserted by [facility] on 6/14/19, due for removal by 6/14/24., 0 Refill(s) ; Catalog Code:   levonorgestrel ; Order Dt/Tm:   6/14/2019 2:08:42 PM CDT

## 2022-02-15 NOTE — NURSING NOTE
Comprehensive Intake Entered On:  4/5/2020 2:53 PM CDT    Performed On:  4/5/2020 2:50 PM CDT by Payton Bhat CMA               Summary   Chief Complaint :   Follow up UTI from 3/25/20, not getting better still having pain with urination, odor to urine, urinary frequency/urgency    Menstrual Status :   Menarcheal   Height Measured :   65.5 in(Converted to: 5 ft 5 in, 166.37 cm)    Systolic Blood Pressure :   104 mmHg   Diastolic Blood Pressure :   60 mmHg   Mean Arterial Pressure :   75 mmHg   Peripheral Pulse Rate :   80 bpm   BP Site :   Right arm   Pulse Site :   Radial artery   BP Method :   Manual   HR Method :   Manual   Temperature Tympanic :   98.4 DegF(Converted to: 36.9 DegC)    Payton Bhat CMA - 4/5/2020 2:50 PM CDT   Health Status   Allergies Verified? :   Yes   Medication History Verified? :   Yes   Medical History Verified? :   No   Pre-Visit Planning Status :   Not completed   Tobacco Use? :   Never smoker   Payton Bhat CMA - 4/5/2020 2:50 PM CDT   Consents   Consent for Immunization Exchange :   Consent Granted   Consent for Immunizations to Providers :   Consent Granted   Payton Bhat CMA - 4/5/2020 2:50 PM CDT   Meds / Allergies   (As Of: 4/5/2020 2:53:55 PM CDT)   Allergies (Active)   No Known Medication Allergies  Estimated Onset Date:   Unspecified ; Created By:   Marla Espitia CMA; Reaction Status:   Active ; Category:   Drug ; Substance:   No Known Medication Allergies ; Type:   Allergy ; Updated By:   Marla Espitia CMA; Reviewed Date:   4/5/2020 2:51 PM CDT        Medication List   (As Of: 4/5/2020 2:53:55 PM CDT)   Prescription/Discharge Order    albuterol  :   albuterol ; Status:   Prescribed ; Ordered As Mnemonic:   Proventil HFA 90 mcg/inh inhalation aerosol ; Simple Display Line:   2 puff(s), Inhale, q4-6 hrs, do not fill till she calls, 1 EA, 1 Refill(s) ; Ordering Provider:   Kelley Coker; Catalog Code:   albuterol ; Order Dt/Tm:   12/24/2019 10:04:21 AM CST           buPROPion  :   buPROPion ; Status:   Prescribed ; Ordered As Mnemonic:   Wellbutrin  mg/24 hours oral tablet, extended release ; Simple Display Line:   150 mg, 1 tab(s), Oral, q 24 hrs, 90 EA, 1 Refill(s) ; Ordering Provider:   Kelley Coker; Catalog Code:   buPROPion ; Order Dt/Tm:   12/24/2019 10:02:00 AM CST          clindamycin topical  :   clindamycin topical ; Status:   Prescribed ; Ordered As Mnemonic:   Clindagel 1% topical gel ; Simple Display Line:   1 ximena, Topical, hs, 30 gm, 5 Refill(s) ; Ordering Provider:   Kelley Coker; Catalog Code:   clindamycin topical ; Order Dt/Tm:   8/2/2019 1:15:06 PM CDT          fluticasone nasal  :   fluticasone nasal ; Status:   Prescribed ; Ordered As Mnemonic:   Flonase 50 mcg/inh nasal spray ; Simple Display Line:   2 spray(s), Nasal, daily, 1 EA, 6 Refill(s) ; Ordering Provider:   Frandy Henrandez MD; Catalog Code:   fluticasone nasal ; Order Dt/Tm:   1/31/2020 2:56:26 PM CST          ondansetron  :   ondansetron ; Status:   Prescribed ; Ordered As Mnemonic:   ondansetron 4 mg oral tablet, disintegrating ; Simple Display Line:   4 mg, 1 tab(s), PO, q8 hrs, 10 tab(s), 1 Refill(s) ; Ordering Provider:   Kelley Coker; Catalog Code:   ondansetron ; Order Dt/Tm:   5/7/2018 7:00:49 PM CDT          sertraline  :   sertraline ; Status:   Prescribed ; Ordered As Mnemonic:   sertraline 100 mg oral tablet ; Simple Display Line:   1.5 tab(s), Oral, daily, 135 tab(s), 3 Refill(s) ; Ordering Provider:   Kelley Coker; Catalog Code:   sertraline ; Order Dt/Tm:   12/24/2019 10:03:38 AM CST          SUMAtriptan  :   SUMAtriptan ; Status:   Prescribed ; Ordered As Mnemonic:   SUMAtriptan 50 mg oral tablet ; Simple Display Line:   See Instructions, 1/2 - 2 tab(s) PO Once  may repeat dose once in 2 hours, maximum of 200 mg per 24 hours, PRN: for migraine headache, 9 tab(s), 11 Refill(s) ; Ordering Provider:   Kelley Coker; Catalog Code:   SUMAtriptan ;  Order Dt/Tm:   10/11/2018 8:05:52 PM CDT            Home Meds    levonorgestrel  :   levonorgestrel ; Status:   Documented ; Ordered As Mnemonic:   Kyleena 19.5 mg intrauterine device ; Simple Display Line:   19.5 mg, 1 EA, Intrauteral, once, inserted by [facility] on 6/14/19, due for removal by 6/14/24., 0 Refill(s) ; Catalog Code:   levonorgestrel ; Order Dt/Tm:   6/14/2019 2:08:42 PM CDT

## 2022-02-15 NOTE — NURSING NOTE
Comprehensive Intake Entered On:  5/27/2021 8:37 AM CDT    Performed On:  5/27/2021 8:30 AM CDT by Elina Parra LPN               Summary   Chief Complaint :   sore throat, sinus pressure, chest tightness and trouble breathing, headache, bodyaches, fatigue, first symptoms started 5/22/21 - verbal consent for video visit on smartphone   Menstrual Status :   Menarcheal   Height/Length Estimated :   65.5 in(Converted to: 5 ft 5 in, 166.37 cm)    Elina Parra LPN - 5/27/2021 8:30 AM CDT   Health Status   Allergies Verified? :   Yes   Medication History Verified? :   Yes   Medical History Verified? :   No   Pre-Visit Planning Status :   Completed   Tobacco Use? :   Never smoker   Elina Parra LPN - 5/27/2021 8:30 AM CDT   Meds / Allergies   (As Of: 5/27/2021 8:37:50 AM CDT)   Allergies (Active)   No Known Medication Allergies  Estimated Onset Date:   Unspecified ; Created By:   Marla Espitia CMA; Reaction Status:   Active ; Category:   Drug ; Substance:   No Known Medication Allergies ; Type:   Allergy ; Updated By:   Marla Espitia CMA; Reviewed Date:   7/23/2020 10:31 AM CDT        Medication List   (As Of: 5/27/2021 8:37:50 AM CDT)   Prescription/Discharge Order    SUMAtriptan  :   SUMAtriptan ; Status:   Prescribed ; Ordered As Mnemonic:   SUMAtriptan 50 mg oral tablet ; Simple Display Line:   See Instructions, 1/2 - 2 tab(s) PO Once  may repeat dose once in 2 hours, maximum of 200 mg per 24 hours, PRN: for migraine headache, 9 tab(s), 11 Refill(s) ; Ordering Provider:   Kelley Coker; Catalog Code:   SUMAtriptan ; Order Dt/Tm:   10/11/2018 8:05:52 PM CDT          albuterol  :   albuterol ; Status:   Prescribed ; Ordered As Mnemonic:   Proventil HFA 90 mcg/inh inhalation aerosol ; Simple Display Line:   2 puff(s), Inhale, q4-6 hrs, do not fill till she calls, 1 EA, 1 Refill(s) ; Ordering Provider:   Kelley Coker; Catalog Code:   albuterol ; Order Dt/Tm:   12/24/2019 10:04:21 AM CST           clindamycin topical  :   clindamycin topical ; Status:   Prescribed ; Ordered As Mnemonic:   Clindagel 1% topical gel ; Simple Display Line:   1 ximena, Topical, hs, 30 gm, 5 Refill(s) ; Ordering Provider:   Kelley Coker; Catalog Code:   clindamycin topical ; Order Dt/Tm:   8/2/2019 1:15:06 PM CDT          sertraline  :   sertraline ; Status:   Prescribed ; Ordered As Mnemonic:   sertraline 100 mg oral tablet ; Simple Display Line:   1.5 tab(s), Oral, daily, 135 tab(s), 3 Refill(s) ; Ordering Provider:   Kelley Coker; Catalog Code:   sertraline ; Order Dt/Tm:   7/17/2020 4:41:48 PM CDT          buPROPion  :   buPROPion ; Status:   Prescribed ; Ordered As Mnemonic:   Wellbutrin  mg/24 hours oral tablet, extended release ; Simple Display Line:   150 mg, 1 tab(s), Oral, q 24 hrs, 90 tab(s), 3 Refill(s) ; Ordering Provider:   Kelley Coker; Catalog Code:   buPROPion ; Order Dt/Tm:   7/17/2020 4:41:28 PM CDT            Home Meds    levonorgestrel  :   levonorgestrel ; Status:   Documented ; Ordered As Mnemonic:   Kyleena 19.5 mg intrauterine device ; Simple Display Line:   19.5 mg, 1 EA, Intrauteral, once, inserted by [facility] on 6/14/19, due for removal by 6/14/24., 0 Refill(s) ; Catalog Code:   levonorgestrel ; Order Dt/Tm:   6/14/2019 2:08:42 PM CDT            ID Risk Screen   Recent Travel History :   No recent travel   Family Member Travel History :   No recent travel   Other Exposure to Infectious Disease :   Unknown   COVID-19 Testing Status :   No positive COVID-19 test   Elina Parra LPN - 5/27/2021 8:30 AM CDT

## 2022-02-15 NOTE — PROGRESS NOTES
Patient:   JUAN SHAW            MRN: [id number]            FIN: [id number]               Age:   18 years     Sex:  Female     :  2000   Associated Diagnoses:   Well child examination   Author:   Kelley Coker      Chief Complaint   10/11/2018 6:31 PM CDT   c/o recurring UTI, current sxs: urinary frequency, dysuria, cloudy urine- also needs ocp refill.      Well Child History   Diet: No caffeine use. Eats on the go. Understands that her diet could be better with more variety. Gym class for exercise, did  biking over the summer and enjoyed this however the weather and school now make this difficult.    Sleep: Wakes up in the middle of the night to urinate for the past 2 weeks with difficulty falling back asleep. No racing thoughts. Otherwise feels rested.     School/social/activities: Currently completing senior year. States that she is bored in school with difficulty focusing, unsure if this is because she is not being challenged. She does very well in school, A's and B's. States that this feeling of bordem has been present more this year than in past years. Would like to pursue biochemistry degree, looking at [employer]. Worked three jobs over the summer, went well.     Asthma is well controlled. ACT: 23. Takes her Advair on a daily basis, familiar with action plan. Has not taken albuterol in 3 months.   Depression: Feels as if her dose of sertraline is working. No complaints about her depression or anxiety.   Migraines: Headaches occur 2x/month, notes that sumatriptan helps signifciantly and would like a refill of this. Did not have relief with amytriptyline.       Additionally, she has had 3 UTIs in the past 5 months (most recently 18 treated with Bactrim DS and prior to that were treated with Macrobid). She is sexually active, negative chlamydia and gonorrhea testing on 18. She has been with the same male partner for the past 2 months, they use condoms.  Today, she notes continued  symptoms of urinary frequency, urinary urgency, cloudy urine, odor, and pain following urination. She denies any rashes or vaginal discharge.  No fevers.       Review of Systems   Constitutional:  Negative.    Eye:  Negative.    Ear/Nose/Mouth/Throat:  Negative.    Respiratory:  Negative.    Cardiovascular:  Negative.    Gastrointestinal:  Negative.    Genitourinary:  Dysuria, Frequent urinary tract infections, Urinary frequency, No hematuria.    Musculoskeletal:  Negative.    Integumentary:  Negative.    Psychiatric:  PHQ-9: 13; NANDO-7: 11.       Health Status   Allergies:    Allergic Reactions (Selected)  No Known Medication Allergies   Medications:  (Selected)   Prescriptions  Prescribed  SUMAtriptan 50 mg oral tablet: See Instructions, Instructions: 1/2 - 2 tab(s) PO Once  may repeat dose once in 2 hours, maximum of 200 mg per 24 hours, PRN: for migraine headache, # 9 tab(s), 11 Refill(s), Type: Soft Stop, Pharmacy: [facility] [id number], 1/2 - 2 tab(s) PO Onc...  ondansetron 4 mg oral tablet, disintegratin tab(s) ( 4 mg ), PO, q8 hrs, # 10 tab(s), 1 Refill(s), Type: Maintenance, Pharmacy: [facility] [id number], 1 tab(s) po q8 hrs  sertraline 100 mg oral tablet: = 1.5 tab(s) ( 150 mg ), PO, Daily, # 135 tab(s), 0 Refill(s), Type: Maintenance, Pharmacy: [facility] [id number], 1.5 tab(s) Oral daily,x90 day(s)  Documented Medications  Documented  Advair Diskus 250 mcg-50 mcg inhalation powder: 2 puff(s), inh, bid, 0 Refill(s), Type: Maintenance  Flovent  mcg/inh inhalation aerosol: 2 puff(s), inh, bid, 0 Refill(s), Type: Maintenance  Karen 3 mg-0.03 mg oral tablet: 1 tab(s), po, daily, 0 Refill(s), Type: Maintenance  albuterol 90mcg/inhalation MDI: 0 Refill(s), Type: Maintenance   Problem list:    All Problems  Asthma / SNOMED CT 065428815 / Confirmed  Mild major depression / SNOMED 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   Family History:    Diabetes mellitus  Grandfather (M)  Seizure  Brother  HTN - Hypertension  Grandfather (M)  Grandmother (M)     Procedure history:    No active procedure history items have been selected or recorded.      Physical Examination   Vital Signs   10/11/2018 6:31 PM CDT Temperature Tympanic 97.9 DegF    Peripheral Pulse Rate 68 bpm    Pulse Site Radial artery    HR Method Manual    Systolic Blood Pressure 98 mmHg    Diastolic Blood Pressure 62 mmHg    Mean Arterial Pressure 74 mmHg    BP Site Right arm    BP Method Manual      General:  No acute distress.    Eye:  Pupils are equal, round and reactive to light, Extraocular movements are intact, Normal conjunctiva.    HENT:  Tympanic membranes are clear, Oral mucosa is moist, No pharyngeal erythema, No sinus tenderness.    Neck:  Supple, Non-tender, No lymphadenopathy, No thyromegaly.    Respiratory:  Lungs clear to auscultation bilaterally.  Equal air entry.  Symmetrical chest expansion.  No wheezing.  .    Cardiovascular:  S1 and S2 with regular rate and rhythm.  No murmurs.  Pulses 2+ in all four extremities.  Brisk capillary refill.  .    Gastrointestinal:  Positive bowel sounds in all four quadrants.  Abdomen is soft, non-distended, non-tender.  No hepatosplenomegaly.  .    Gynecology:  Normal genitalia for age and sex, No lesions, Small amount of white discharge in vaginal vault. .         Labia: Within normal limits.         Vagina: Within normal limits.         Cervix: Os ( Closed ), No cervical motion tenderness. .         Uterus: Mobile.         Ovaries: Unable to palpate. .    Musculoskeletal:  Normal gait, Spine straight with forward flexion. .    Integumentary:  Warm, Dry, No rash.    Neurologic:  Alert, Oriented, No focal deficits, Cranial Nerves II-XII are grossly intact, Normal deep tendon reflexes.    Psychiatric:  Appropriate mood & affect.       Review / Management   Results review:  Lab results    10/11/2018 7:33 PM CDT Wet Prep Yeast None Seen    Wet Prep Trichomonas None Seen    Wet Prep Clue Cells None Seen   10/11/2018 7:17 PM CDT UA pH 7.5    UA Specific Gravity 1.025    UA Glucose NEGATIVE    UA Bilirubin NEGATIVE    UA Ketones NEGATIVE    Urine Occult Blood 1+    UA Protein 2+    UA Nitrite NEGATIVE    UA Leukocyte Esterase 3+   10/11/2018 7:15 PM CDT UA Epithelial Cells Few    UA Amorphous Present    UA WBC     UA RBC 6-10    UA Bacteria Many   , UC pending. .       Impression and Plan   Diagnosis     Well child examination ([id number] Z00.129).     Plan:  Immunizations per schedule, Anticipatory guidance reviewed:  Open communication with parents, daily breakfast, physical activity, avoidance drugs/alcohol/tobacco, car safety.    , UA is consistent with infection, 10 days Bactrim DS, push fluids, cranberry juice/tablets, urination following intercourse. UC pending. Patient aware that we will call her if necessary to switch antibiotic regimen.   We discussed referral to urology due to her recent history of recurrent UTIs, patient would like to defer this today but will consider with next UTI.   Medications refilled as below.    .    Orders     Orders (Selected)   Outpatient Orders  Ordered (In Transit)  Culture, Urine, Routine* ([facility]): Specimen Type: Urine (Clean Catch), Collection Date: 10/11/18 19:13:00 CDT  Prescriptions  Prescribed  Bactrim  mg-160 mg oral tablet: 1 tab(s), PO, BID, # 20 tab(s), 0 Refill(s), Type: Maintenance, Pharmacy: [facility] [id number], 1 tab(s) Oral bid,x10 day(s)  SUMAtriptan 50 mg oral tablet: See Instructions, Instructions: 1/2 - 2 tab(s) PO Once  may repeat dose once in 2 hours, maximum of 200 mg per 24 hours, PRN: for migraine headache, # 9 tab(s), 11 Refill(s), Type: Soft Stop, Pharmacy: [facility] [id number], 1/2 - 2 tab(s) PO Onc...  Karen 3 mg-0.03 mg oral tablet: 1 tab(s), po, daily, # 84 tab(s), 4 Refill(s), Type: Maintenance, Pharmacy:  [id number], 1 tab(s) Oral daily  sertraline 100 mg oral tablet: = 1.5 tab(s) ( 150 mg ), PO, Daily, # 135 tab(s), 1 Refill(s), Type: Maintenance, [facility] [id number], 1.5 tab(s) Oral daily.

## 2022-02-15 NOTE — LETTER
(Inserted Image. Unable to display)       June 20, 2019      JUAN SHAW  [address], [place] [id number]          Dear JUAN,      Thank you for selecting [facility] for your healthcare needs.     Our records indicate you are due for the following services:     Medication Check    To schedule an appointment or if you have further questions, please contact your primary clinic:   [facility]       [phone]   [facility]       [phone]              [facility]     [phone]    Powered by [facility]    Sincerely,    ZANE AgNP

## 2022-02-15 NOTE — NURSING NOTE
1333 LMTCB.  All labs came back normal. At this time [facility] would like her to be tested for TB, either the skin test or blood (Quantiferon Gold). Need to know what she would rather do and then help her get scheduled.

## 2022-03-02 NOTE — TELEPHONE ENCOUNTER
---------------------  From: Nicolle Toure LPN (Phone Messages Pool ([id number]_[facility]))   To: Phone Messages Pool ([id number]_[place] [facility] [place]);     Sent: 1/21/2022 12:40:51 PM CST  Subject: results     Phone Message    PCP:   CHT      Time of Call:  10:58am       Person Calling:  pt  Phone number:  [phone]    Returned call at: 12:38pm    Note:   Pt LM asking for TB results from 6 months ago to be emailed to her.    Returned call and LM asking if she would like the result letter from 4/12/21 mailed to her again or if she needs a copy of lab report she will need to stop in and sign ARMANDO. Asked that she call back and let us know.    Last office visit and reason:  10/28/21 telephone encounterCall to pt at [phone]. Pt says she has it all taken care of.